# Patient Record
Sex: FEMALE | Race: WHITE
[De-identification: names, ages, dates, MRNs, and addresses within clinical notes are randomized per-mention and may not be internally consistent; named-entity substitution may affect disease eponyms.]

---

## 2017-11-22 NOTE — PCM.PRNOTE
- Free Text/Narrative


Note: 


PROCEDURE PERFORMED:


Esophagogastroduodenoscopy





INDICATIONS FOR PROCEDURE:


Dysphagia, reflux





PRE-PROCEDURE INFORMATION: 


Ms. Concepcion is an 85yoF with longstanding history of GERD and several months 

of occasional upper-to-mid esophageal dysphagia to solids. She currently takes 

lansoprazole 30mg daily. See H&P for details.





CONSENT:


Informed consent was obtained prior to the procedure after discussion of the 

risks (pain, bleeding, infection, perforation), benefits and alternatives and 

expected outcomes were discussed with the patient. Questions answered, verbal 

consent given and consent form signed. 





PROCEDURAL PAUSE:


Completed





DESCRIPTION OF PROCEDURE: Ms. Concepcion was brought back to the endoscopy suite 

and placed in the left reclined position.  Bite block placed.  After adequate 

sedation and anesthetic was administered, endoscope was inserted into the 

patient's mouth and was passed into the stomach without difficulty though a 

torturous esophagus noted.  Examined portion of the duodenum normal. Pylorus 

normal appearing. Mild gastritis without active bleeding. Biopsies taken to 

assess for H. pylori. No poylps or masses. Retroflexion performed. 5cm hiatal 

hernia was present with the gastroesophageal junction at 36 cm from the 

incisors.  The examined esophagus was mildly inflamed in the distal portion, 

but without rings or strictures. Biopsies taken to assess for esophagitis. The 

scope was removed without difficulty. Patient tolerated the procedure well. No 

complications.


 


IMPRESSION:


1. Torturous esophagus


2. Mild distal esophagitis


3. 5cm hiatal hernia


4. Mild gastitis





PLAN: 


Await biopsy results. Continue PPI therapy. Consider speech therapy evaluation 

and barium swallow study if upper-to-mid esophageal dysphagia persists.

## 2020-05-01 ENCOUNTER — HOSPITAL ENCOUNTER (EMERGENCY)
Dept: HOSPITAL 77 - KA.ED | Age: 85
Discharge: HOME | End: 2020-05-01
Payer: MEDICARE

## 2020-05-01 DIAGNOSIS — Z79.82: ICD-10-CM

## 2020-05-01 DIAGNOSIS — N30.00: ICD-10-CM

## 2020-05-01 DIAGNOSIS — Z88.5: ICD-10-CM

## 2020-05-01 DIAGNOSIS — K29.70: Primary | ICD-10-CM

## 2020-05-01 DIAGNOSIS — Z79.899: ICD-10-CM

## 2020-05-01 DIAGNOSIS — I10: ICD-10-CM

## 2020-05-01 DIAGNOSIS — E03.9: ICD-10-CM

## 2020-05-01 DIAGNOSIS — E78.00: ICD-10-CM

## 2020-05-01 DIAGNOSIS — Z88.0: ICD-10-CM

## 2020-05-01 DIAGNOSIS — Z88.8: ICD-10-CM

## 2020-05-01 DIAGNOSIS — K21.9: ICD-10-CM

## 2020-05-01 LAB
ANION GAP SERPL CALC-SCNC: 15.1 MMOL/L (ref 5–15)
CHLORIDE SERPL-SCNC: 104 MMOL/L (ref 98–115)
SODIUM SERPL-SCNC: 145 MMOL/L (ref 136–145)

## 2020-05-01 NOTE — EDM.PDOC
ED HPI GENERAL MEDICAL PROBLEM





- General


Chief Complaint: General


Stated Complaint: ABDOMINAL PAIN


Time Seen by Provider: 05/01/20 19:20


Source of Information: Reports: Patient


History Limitations: Reports: No Limitations





- History of Present Illness


INITIAL COMMENTS - FREE TEXT/NARRATIVE: 





88 YO WF presents to ER with complaints of epigastric abdominal pain with 

associated nausea which began at 2pm today. Pt reports pain as a burning 

sensation with some intermittent cramping in upper abdominal area. Pt with 

history of gastritis and GERD. Pt denies chest pain, shortness of breath, 

dizziness or diaphoresis. Pt reports some associated nausea without vomiting. 

Pt denies fever/chills, no cough or URI symptoms. Pt reports normal bowel 

movements and denies blood in BM. 


Onset: Today


Duration: Chronic


Location: Reports: Abdomen


Quality: Reports: Burning


Severity: Moderate


Improves with: Reports: None


Worsens with: Reports: None


Associated Symptoms: Reports: No Other Symptoms, Nausea/Vomiting


  ** Bilateral Upper Abdomen


Pain Score (Numeric/FACES): 4





- Related Data


 Allergies











Allergy/AdvReac Type Severity Reaction Status Date / Time


 


codeine Allergy  "passed Verified 05/01/20 19:05





   out"  


 


lisinopril Allergy  Cough Verified 05/01/20 19:05


 


Penicillins Allergy  Rash Verified 05/01/20 19:05











Home Meds: 


 Home Meds





Acetaminophen [Tylenol] 650 mg PO Q6H PRN 11/21/17 [History]


Aspirin [Halfprin] 81 mg PO BRK 11/21/17 [History]


Calcium Carbonate/Vitamin D3 [Calcium 500-Vit D3 200 Tablet] 1 each PO DAILY 11/ 21/17 [History]


Denosumab [Prolia] 60 mg SUBCUT ASDIRECTED 11/21/17 [History]


Dextran 70/Hypromellose [Artificial Tears] 1 drop EYEBOTH DAILY PRN 11/21/17 [

History]


Lansoprazole [Prevacid] 30 mg PO DAILY 11/21/17 [History]


Levothyroxine [Synthroid] 50 mcg PO ACBREAKFAST 11/21/17 [History]


Losartan/Hydrochlorothiazide [Losartan-HCTZ 100-12.5 MG] 1 each PO TID 11/21/17 

[History]


Omega-3/DHA/Epa/Fish Oil [Omega 3 500 Softgel] 1 each PO BID 11/21/17 [History]


Potassium Chloride 10 meq PO DAILY 11/21/17 [History]


atorvaSTATin [Lipitor] 40 mg PO BEDTIME 11/21/17 [History]


rOPINIRole [Requip] 1 mg PO 1200,1800,2100 11/21/17 [History]


traZODone HCl [Trazodone HCl] 100 mg PO BEDTIME 11/21/17 [History]


Famotidine [Pepcid] 20 mg PO BID #15 tab 05/01/20 [Rx]


Sulfamethoxazole/Trimethoprim [Septra DS] 1 each PO BID #14 tab 05/01/20 [Rx]











Past Medical History


HEENT History: Reports: Macular Degeneration


Cardiovascular History: Reports: Heart Murmur, High Cholesterol, Hypertension


Gastrointestinal History: Reports: GERD, Hiatal Hernia


Endocrine/Metabolic History: Reports: Hypothyroidism, Osteoporosis





- Infectious Disease History


Infectious Disease History: Reports: Chicken Pox, Measles, Pertussis (Whooping 

Cough)





- Past Surgical History


Head Surgeries/Procedures: Reports: None


Musculoskeletal Surgical History: Reports: Knee Replacement, Shoulder Surgery


Other Musculoskeletal Surgeries/Procedures:: Knee replacements 12/10 & 4/13, 

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





Social & Family History





- Tobacco Use


Smoking Status *Q: Never Smoker





- Caffeine Use


Caffeine Use: Reports: None





- Recreational Drug Use


Recreational Drug Use: No





ED ROS GENERAL





- Review of Systems


Review Of Systems: See Below


Constitutional: Reports: No Symptoms


HEENT: Reports: No Symptoms


Respiratory: Reports: No Symptoms


Cardiovascular: Reports: No Symptoms


Endocrine: Reports: No Symptoms


GI/Abdominal: Reports: Abdominal Pain, Nausea


: Reports: No Symptoms


Musculoskeletal: Reports: No Symptoms


Skin: Reports: No Symptoms


Neurological: Reports: No Symptoms


Psychiatric: Reports: No Symptoms


Hematologic/Lymphatic: Reports: No Symptoms


Immunologic: Reports: No Symptoms





ED EXAM, GENERAL





- Physical Exam


Exam: See Below


Exam Limited By: No Limitations


General Appearance: Alert, WD/WN, No Apparent Distress


Throat/Mouth: Normal Inspection, Normal Lips, Normal Teeth, Normal Gums, Normal 

Oropharynx, Normal Voice, No Airway Compromise


Head: Atraumatic, Normocephalic


Neck: Normal Inspection, Supple, Non-Tender, Full Range of Motion


Respiratory/Chest: No Respiratory Distress, Lungs Clear, Normal Breath Sounds, 

No Accessory Muscle Use, Chest Non-Tender


Cardiovascular: Normal Peripheral Pulses, Regular Rate, Rhythm, No Edema, No 

Gallop, No JVD, No Murmur, No Rub


GI/Abdominal: Normal Bowel Sounds, Soft, Non-Tender, No Organomegaly, No 

Distention, No Abnormal Bruit, No Mass


Back Exam: Normal Inspection, Full Range of Motion, NT


Extremities: Normal Inspection, Normal Range of Motion, Non-Tender, Normal 

Capillary Refill, No Pedal Edema


Neurological: Alert, Oriented, CN II-XII Intact, Normal Cognition, Normal Gait, 

Normal Reflexes, No Motor/Sensory Deficits


Psychiatric: Normal Affect, Normal Mood


Skin Exam: Warm, Dry, Intact, Normal Color, No Rash


Lymphatic: No Adenopathy





EKG INTERPRETATION


EKG Date: 05/01/20


Time: 19:19


Rhythm: NSR


Rate (Beats/Min): 73


Axis: Normal


P-Wave: Present


QRS: Normal


ST-T: Normal


QT: Normal


Comparison: No Change





Course





- Vital Signs


Last Recorded V/S: 


 Last Vital Signs











Temp  36.9 C   05/01/20 19:31


 


Pulse  80   05/01/20 20:46


 


Resp  19   05/01/20 20:46


 


BP  149/74 H  05/01/20 20:46


 


Pulse Ox  97   05/01/20 20:46














- Orders/Labs/Meds


Orders: 


 Active Orders 24 hr











 Category Date Time Status


 


 EKG Documentation Completion [RC] ASDIRECTED Care  05/01/20 19:12 Active


 


 EKG 12 Lead [EK] Urgent Ther  05/01/20 19:11 Ordered











Labs: 


 Laboratory Tests











  05/01/20 05/01/20 05/01/20 Range/Units





  19:15 19:40 19:40 


 


WBC   10.58 H   (5.00-10.00)  10^3/uL


 


RBC   4.17   (3.80-5.50)  10^6/uL


 


Hgb   13.0   (12.0-16.0)  g/dL


 


Hct   38.1   (37.0-47.0)  %


 


MCV   91.4   (82.0-92.0)  fL


 


MCH   31.2 H   (27.0-31.0)  pg


 


MCHC   34.1   (32.0-36.0)  g/dL


 


RDW   13.0   (11.5-14.5)  %


 


Plt Count   185   (150-400)  10^3/uL


 


MPV   8.6   (7.4-10.4)  fL


 


Immature Gran % (Auto)   0.1   (0.0-5.0)  %


 


Neut % (Auto)   87.4 H   (50.0-70.0)  %


 


Lymph % (Auto)   6.1 L   (20.0-40.0)  %


 


Mono % (Auto)   5.5   (2.0-8.0)  %


 


Eos % (Auto)   0.6 L   (1.0-3.0)  %


 


Baso % (Auto)   0.3   (0.0-1.0)  %


 


Immature Gran # (Auto)   0.01   (0.00-0.50)  10^3/uL


 


Neut # (Auto)   9.25 H   (2.50-7.00)  10^3/uL


 


Lymph # (Auto)   0.65 L   (1.00-4.00)  10^3/uL


 


Mono # (Auto)   0.58   (0.10-0.80)  10^3/uL


 


Eos # (Auto)   0.06 L   (0.10-0.30)  10^3/uL


 


Baso # (Auto)   0.03   (0.00-0.10)  10^3/uL


 


Sodium    145  (136-145)  mmol/L


 


Potassium    4.0  (3.3-5.3)  mmol/L


 


Chloride    104  ()  mmol/L


 


Carbon Dioxide    29.9  (21.0-32.0)  mmol/L


 


Anion Gap    15.1 H  (5-15)  mmol/L


 


BUN    23  (6-25)  mg/dL


 


Creatinine    1.01  (0.51-1.17)  mg/dL


 


Est Cr Clr Drug Dosing    TNP  


 


Estimated GFR (MDRD)    52  mL/min


 


Glucose    134 H (75 - 99) mg/dL


 


Calcium    9.9  (8.7-10.3)  mg/dL


 


Total Bilirubin    0.6  (0.2-1.0)  mg/dL


 


AST    22  (15-37)  U/L


 


ALT    27  (12-78)  U/L


 


Alkaline Phosphatase    82  ()  IU/L


 


Creatine Kinase     ()  U/L


 


CK-MB (CK-2)     (0.00-4.30)  ng/mL


 


Troponin I     (0.00-0.070)  ng/mL


 


Total Protein    6.5  (6.4-8.2)  g/dL


 


Albumin    3.71  (3.00-4.80)  g/dL


 


Lipase    138  ()  U/L


 


Specimen Type  Urinvoid    


 


Urine Color  Yellow    (YELLOW)  


 


Urine Appearance  Slightly cloudy H    (CLEAR)  


 


Urine pH  6.5    (5.0-9.0)  


 


Ur Specific Gravity  1.020    (1.005-1.030)  


 


Urine Protein  Negative    (NEGATIVE)  mg/dL


 


Urine Glucose (UA)  Negative    (NEGATIVE)  mg/dL


 


Urine Ketones  Trace H    (NEGATIVE)  mg/dL


 


Urine Occult Blood  Negative    (NEGATIVE)  


 


Urine Nitrite  Negative    (NEGATIVE)  


 


Urine Bilirubin  Negative    (NEGATIVE)  


 


Urine Urobilinogen  0.2    (0.2-1.0)  E.U./dL


 


Ur Leukocyte Esterase  Small H    (NEGATIVE)  


 


Urine RBC  0-5    (0-5)  /HPF


 


Urine WBC  40-50 H    (0-5)  /HPF


 


Ur Epithelial Cells  Few    /LPF


 


Other Crystals  See note    /HPF


 


Amorphous Sediment  Few    (0/HPF)  /HPF


 


Urine Bacteria  Few    (NONE TO FEW)  /HPF














  05/01/20 Range/Units





  19:40 


 


WBC   (5.00-10.00)  10^3/uL


 


RBC   (3.80-5.50)  10^6/uL


 


Hgb   (12.0-16.0)  g/dL


 


Hct   (37.0-47.0)  %


 


MCV   (82.0-92.0)  fL


 


MCH   (27.0-31.0)  pg


 


MCHC   (32.0-36.0)  g/dL


 


RDW   (11.5-14.5)  %


 


Plt Count   (150-400)  10^3/uL


 


MPV   (7.4-10.4)  fL


 


Immature Gran % (Auto)   (0.0-5.0)  %


 


Neut % (Auto)   (50.0-70.0)  %


 


Lymph % (Auto)   (20.0-40.0)  %


 


Mono % (Auto)   (2.0-8.0)  %


 


Eos % (Auto)   (1.0-3.0)  %


 


Baso % (Auto)   (0.0-1.0)  %


 


Immature Gran # (Auto)   (0.00-0.50)  10^3/uL


 


Neut # (Auto)   (2.50-7.00)  10^3/uL


 


Lymph # (Auto)   (1.00-4.00)  10^3/uL


 


Mono # (Auto)   (0.10-0.80)  10^3/uL


 


Eos # (Auto)   (0.10-0.30)  10^3/uL


 


Baso # (Auto)   (0.00-0.10)  10^3/uL


 


Sodium   (136-145)  mmol/L


 


Potassium   (3.3-5.3)  mmol/L


 


Chloride   ()  mmol/L


 


Carbon Dioxide   (21.0-32.0)  mmol/L


 


Anion Gap   (5-15)  mmol/L


 


BUN   (6-25)  mg/dL


 


Creatinine   (0.51-1.17)  mg/dL


 


Est Cr Clr Drug Dosing   


 


Estimated GFR (MDRD)   mL/min


 


Glucose  (75 - 99) mg/dL


 


Calcium   (8.7-10.3)  mg/dL


 


Total Bilirubin   (0.2-1.0)  mg/dL


 


AST   (15-37)  U/L


 


ALT   (12-78)  U/L


 


Alkaline Phosphatase   ()  IU/L


 


Creatine Kinase  81  ()  U/L


 


CK-MB (CK-2)  4.20  (0.00-4.30)  ng/mL


 


Troponin I  0.04  (0.00-0.070)  ng/mL


 


Total Protein   (6.4-8.2)  g/dL


 


Albumin   (3.00-4.80)  g/dL


 


Lipase   ()  U/L


 


Specimen Type   


 


Urine Color   (YELLOW)  


 


Urine Appearance   (CLEAR)  


 


Urine pH   (5.0-9.0)  


 


Ur Specific Gravity   (1.005-1.030)  


 


Urine Protein   (NEGATIVE)  mg/dL


 


Urine Glucose (UA)   (NEGATIVE)  mg/dL


 


Urine Ketones   (NEGATIVE)  mg/dL


 


Urine Occult Blood   (NEGATIVE)  


 


Urine Nitrite   (NEGATIVE)  


 


Urine Bilirubin   (NEGATIVE)  


 


Urine Urobilinogen   (0.2-1.0)  E.U./dL


 


Ur Leukocyte Esterase   (NEGATIVE)  


 


Urine RBC   (0-5)  /HPF


 


Urine WBC   (0-5)  /HPF


 


Ur Epithelial Cells   /LPF


 


Other Crystals   /HPF


 


Amorphous Sediment   (0/HPF)  /HPF


 


Urine Bacteria   (NONE TO FEW)  /HPF











Meds: 


Medications














Discontinued Medications














Generic Name Dose Route Start Last Admin





  Trade Name Freq  PRN Reason Stop Dose Admin


 


Al Hydroxide/Mg Hydroxide 30  0 ml  05/01/20 19:51  05/01/20 19:56





  ml/ Lidocaine HCl 15 ml  PO  05/01/20 19:52  45 ml





  ONETIME ONE   Administration





     





     





     





     


 


Trimethoprim/Sulfamethoxazole  1 tab  05/01/20 20:27  05/01/20 20:52





  Septra Ds  PO  05/01/20 20:28  1 tab





  ONETIME ONE   Administration





     





     





     





     


 


Trimethoprim/Sulfamethoxazole  1 tab  05/01/20 20:34  05/01/20 20:52





  Septra Ds  PO  05/01/20 20:35  1 tab





  ONETIME ONE   Administration





     





     





     





     














- Re-Assessments/Exams


Free Text/Narrative Re-Assessment/Exam: 





05/01/20 21:06


Pt reports pain resolved after GI cocktail. Pt alert and oriented x 4 and in no 

acute distress. 





Departure





- Departure


Time of Disposition: 20:31


Disposition: Home, Self-Care 01


Condition: Good


Clinical Impression: 


Gastritis


Qualifiers:


 Gastritis type: unspecified gastritis Chronicity: unspecified Gastritis 

bleeding: without bleeding Qualified Code(s): K29.70 - Gastritis, unspecified, 

without bleeding





Urinary tract infection


Qualifiers:


 Urinary tract infection type: acute cystitis Hematuria presence: without 

hematuria Qualified Code(s): N30.00 - Acute cystitis without hematuria








- Discharge Information


Prescriptions: 


Famotidine [Pepcid] 20 mg PO BID #15 tab


Sulfamethoxazole/Trimethoprim [Septra DS] 1 each PO BID #14 tab


Instructions:  Gastritis, Adult, Urinary Tract Infection, Adult, Easy-to-Read


Referrals: 


Giselle Argueta MD [Primary Care Provider] - 


Forms:  ED Department Discharge


Additional Instructions: 


1. Discharge home


2. Septra DS twice/day x 7 days


3. pepcid 20mg twice/day x 7 days


4. follow up with PCP next 48-72 hours for recheck


5. return to ER for worsening symptoms








Sepsis Event Note





- Evaluation


Sepsis Screening Result: No Definite Risk





- Focused Exam


Vital Signs: 


 Vital Signs











  Temp Pulse Resp BP Pulse Ox


 


 05/01/20 20:46   80  19  149/74 H  97


 


 05/01/20 19:40   73  20  170/70 H  96


 


 05/01/20 19:31  36.9 C  73  20  180/64 H  96











Date Exam was Performed: 05/01/20


Time Exam was Performed: 21:06





- My Orders


Last 24 Hours: 


My Active Orders





05/01/20 19:11


EKG 12 Lead [EK] Urgent 





05/01/20 19:12


EKG Documentation Completion [RC] ASDIRECTED 














- Assessment/Plan


Last 24 Hours: 


My Active Orders





05/01/20 19:11


EKG 12 Lead [EK] Urgent 





05/01/20 19:12


EKG Documentation Completion [RC] ASDIRECTED 











Assessment:: 





1. Gastritis 


2. UTI


Plan: 





1. Discharge home


2. Septra DS twice/day x 7 days


3. pepcid 20mg twice/day x 7 days


4. follow up with PCP next 48-72 hours for recheck


5. return to ER for worsening symptoms

## 2020-05-02 ENCOUNTER — HOSPITAL ENCOUNTER (EMERGENCY)
Dept: HOSPITAL 77 - KA.ED | Age: 85
Discharge: HOME | End: 2020-05-02
Payer: MEDICARE

## 2020-05-02 DIAGNOSIS — Z88.0: ICD-10-CM

## 2020-05-02 DIAGNOSIS — Z79.899: ICD-10-CM

## 2020-05-02 DIAGNOSIS — E03.9: ICD-10-CM

## 2020-05-02 DIAGNOSIS — Z88.8: ICD-10-CM

## 2020-05-02 DIAGNOSIS — Z88.5: ICD-10-CM

## 2020-05-02 DIAGNOSIS — S51.811A: Primary | ICD-10-CM

## 2020-05-02 DIAGNOSIS — E78.00: ICD-10-CM

## 2020-05-02 DIAGNOSIS — K21.9: ICD-10-CM

## 2020-05-02 DIAGNOSIS — I10: ICD-10-CM
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
## 2020-05-02 DIAGNOSIS — Z98.890: ICD-10-CM

## 2020-05-02 NOTE — EDM.PDOC
ED HPI GENERAL MEDICAL PROBLEM





- General


Chief Complaint: Upper Extremity Injury/Pain


Stated Complaint: SKIN TEAR ARM


Time Seen by Provider: 05/02/20 16:24


Source of Information: Reports: Patient


History Limitations: Reports: No Limitations





- History of Present Illness


INITIAL COMMENTS - FREE TEXT/NARRATIVE: 





88 YO WF presents to ER with minor injury to right forearm after stumbling and 

bumping her arm on a piece of furniture. Pt reports mild bleeding at site. Pt 

was concerned she may need sutures prompting ER evaluation. Pt denies any pain, 

bleeding is well controlled. Pt denies dizziness/lightheadedness, no chest pain/

shortness of breath. Pt ambulating without difficulty and denies any other 

injury. 


Onset: Today


Location: Reports: Upper Extremity, Right


Severity: Mild


Improves with: Reports: None


Worsens with: Reports: None


Associated Symptoms: Reports: No Other Symptoms





- Related Data


 Allergies











Allergy/AdvReac Type Severity Reaction Status Date / Time


 


codeine Allergy  "passed Verified 05/01/20 19:05





   out"  


 


lisinopril Allergy  Cough Verified 05/01/20 19:05


 


Penicillins Allergy  Rash Verified 05/01/20 19:05











Home Meds: 


 Home Meds





Acetaminophen [Tylenol] 650 mg PO Q6H PRN 11/21/17 [History]


Aspirin [Halfprin] 81 mg PO BRK 11/21/17 [History]


Calcium Carbonate/Vitamin D3 [Calcium 500-Vit D3 200 Tablet] 1 each PO DAILY 11/ 21/17 [History]


Denosumab [Prolia] 60 mg SUBCUT ASDIRECTED 11/21/17 [History]


Dextran 70/Hypromellose [Artificial Tears] 1 drop EYEBOTH DAILY PRN 11/21/17 [

History]


Lansoprazole [Prevacid] 30 mg PO DAILY 11/21/17 [History]


Levothyroxine [Synthroid] 50 mcg PO ACBREAKFAST 11/21/17 [History]


Losartan/Hydrochlorothiazide [Losartan-HCTZ 100-12.5 MG] 1 each PO TID 11/21/17 

[History]


Omega-3/DHA/Epa/Fish Oil [Omega 3 500 Softgel] 1 each PO BID 11/21/17 [History]


Potassium Chloride 10 meq PO DAILY 11/21/17 [History]


atorvaSTATin [Lipitor] 40 mg PO BEDTIME 11/21/17 [History]


rOPINIRole [Requip] 1 mg PO 1200,1800,2100 11/21/17 [History]


traZODone HCl [Trazodone HCl] 100 mg PO BEDTIME 11/21/17 [History]


Famotidine [Pepcid] 20 mg PO BID #15 tab 05/01/20 [Rx]


Sulfamethoxazole/Trimethoprim [Septra DS] 1 each PO BID #14 tab 05/01/20 [Rx]











Past Medical History


HEENT History: Reports: Macular Degeneration


Cardiovascular History: Reports: Heart Murmur, High Cholesterol, Hypertension


Gastrointestinal History: Reports: GERD, Hiatal Hernia


Endocrine/Metabolic History: Reports: Hypothyroidism, Osteoporosis





- Infectious Disease History


Infectious Disease History: Reports: Chicken Pox, Measles, Pertussis (Whooping 

Cough)





- Past Surgical History


Head Surgeries/Procedures: Reports: None


Musculoskeletal Surgical History: Reports: Knee Replacement, Shoulder Surgery


Other Musculoskeletal Surgeries/Procedures:: Knee replacements 12/10 & 4/13, 

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





Social & Family History





- Family History


Family Medical History: Noncontributory





- Caffeine Use


Caffeine Use: Reports: None





Review of Systems





- Review of Systems


Review Of Systems: See Below


Constitutional: Reports: No Symptoms


Eyes: Reports: No Symptoms


Ears: Reports: No Symptoms


Nose: Reports: No Symptoms


Mouth/Throat: Reports: No Symptoms


Respiratory: Reports: No Symptoms


Cardiovascular: Reports: No Symptoms


GI/Abdominal: Reports: No Symptoms


Genitourinary: Reports: No Symptoms


Musculoskeletal: Reports: No Symptoms


Skin: Reports: Wound (skin tear without laceration to right forearm )


Neurological: Reports: No Symptoms


Psychiatric: Reports: No Symptoms





ED EXAM, GENERAL





- Physical Exam


Exam: See Below


Exam Limited By: No Limitations


General Appearance: Alert, WD/WN, No Apparent Distress


Head: Atraumatic, Normocephalic


Neck: Normal Inspection, Supple, Non-Tender, Full Range of Motion


Respiratory/Chest: No Respiratory Distress, Lungs Clear, Normal Breath Sounds, 

No Accessory Muscle Use, Chest Non-Tender


Cardiovascular: Normal Peripheral Pulses, Regular Rate, Rhythm, No Edema, No 

Gallop, No JVD, No Murmur, No Rub


GI/Abdominal: Normal Bowel Sounds, Soft, Non-Tender, No Organomegaly, No 

Distention, No Abnormal Bruit, No Mass


Back Exam: Normal Inspection, Full Range of Motion, NT


Extremities: Normal Range of Motion, Non-Tender, No Pedal Edema, Normal 

Capillary Refill


Neurological: Alert, Oriented, CN II-XII Intact, Normal Cognition, Normal Gait, 

Normal Reflexes, No Motor/Sensory Deficits


Psychiatric: Normal Affect, Normal Mood


Skin Exam: Warm, Dry, Normal Color, No Rash, Wound/Incision (skin tear without 

laceration to right forearm )





Course





- Orders/Labs/Meds


Orders: 





 Active Orders 24 hr











 Category Date Time Status


 


 Bacitracin/Neomycin/Polymyxin [Triple Antibiotic Oint] Med  05/02/20 16:34 Once





 1 each TOP ONETIME ONE   








 Medication Orders





Neomycin/Polymyxin/Bacitracin (Triple Antibiotic Oint)  1 each TOP ONETIME ONE


   Stop: 05/02/20 16:35








Meds: 





Medications











Generic Name Dose Route Start Last Admin





  Trade Name Freq  PRN Reason Stop Dose Admin


 


Neomycin/Polymyxin/Bacitracin  1 each  05/02/20 16:34  





  Triple Antibiotic Oint  TOP  05/02/20 16:35  





  ONETIME ONE   





     





     





     





     














Departure





- Departure


Time of Disposition: 16:47


Disposition: Home, Self-Care 01


Condition: Good


Clinical Impression: 


 Skin tear








- Discharge Information


Instructions:  Skin Tear, Easy-to-Read


Referrals: 


Giselle Argueta MD [Primary Care Provider] - 


Forms:  ED Department Discharge


Additional Instructions: 


1. discharge home


2. wound care instructions given


3. tetanus UTD


4. keep wound clean and dry


5. follow up with PCP as needed


6. return to ER for worsening symptoms or signs of infection





- My Orders


Last 24 Hours: 





My Active Orders





05/02/20 16:34


Bacitracin/Neomycin/Polymyxin [Triple Antibiotic Oint]   1 each TOP ONETIME ONE 














- Assessment/Plan


Last 24 Hours: 





My Active Orders





05/02/20 16:34


Bacitracin/Neomycin/Polymyxin [Triple Antibiotic Oint]   1 each TOP ONETIME ONE 











Assessment:: 





1. skin tear without laceration to right forearm 


Plan: 





1. discharge home


2. wound care instructions given


3. tetanus UTD


4. keep wound clean and dry


5. follow up with PCP as needed


6. return to ER for worsening symptoms or signs of infection

## 2021-11-06 ENCOUNTER — HOSPITAL ENCOUNTER (INPATIENT)
Dept: HOSPITAL 77 - KA.ED | Age: 86
LOS: 2 days | Discharge: HOME | DRG: 390 | End: 2021-11-08
Attending: NURSE PRACTITIONER | Admitting: PHYSICIAN ASSISTANT
Payer: MEDICARE

## 2021-11-06 DIAGNOSIS — I35.0: ICD-10-CM

## 2021-11-06 DIAGNOSIS — E78.00: ICD-10-CM

## 2021-11-06 DIAGNOSIS — H35.30: ICD-10-CM

## 2021-11-06 DIAGNOSIS — I36.1: ICD-10-CM

## 2021-11-06 DIAGNOSIS — R13.10: ICD-10-CM

## 2021-11-06 DIAGNOSIS — M41.9: ICD-10-CM

## 2021-11-06 DIAGNOSIS — K56.609: Primary | ICD-10-CM

## 2021-11-06 DIAGNOSIS — E87.6: ICD-10-CM

## 2021-11-06 DIAGNOSIS — Z88.5: ICD-10-CM

## 2021-11-06 DIAGNOSIS — M43.10: ICD-10-CM

## 2021-11-06 DIAGNOSIS — M54.16: ICD-10-CM

## 2021-11-06 DIAGNOSIS — E03.9: ICD-10-CM

## 2021-11-06 DIAGNOSIS — Z96.659: ICD-10-CM

## 2021-11-06 DIAGNOSIS — K44.9: ICD-10-CM

## 2021-11-06 DIAGNOSIS — G47.00: ICD-10-CM

## 2021-11-06 DIAGNOSIS — Z88.0: ICD-10-CM

## 2021-11-06 DIAGNOSIS — Z20.822: ICD-10-CM

## 2021-11-06 DIAGNOSIS — Z79.899: ICD-10-CM

## 2021-11-06 DIAGNOSIS — I10: ICD-10-CM

## 2021-11-06 DIAGNOSIS — M19.90: ICD-10-CM

## 2021-11-06 DIAGNOSIS — Z88.8: ICD-10-CM

## 2021-11-06 DIAGNOSIS — K57.90: ICD-10-CM

## 2021-11-06 DIAGNOSIS — Z96.619: ICD-10-CM

## 2021-11-06 DIAGNOSIS — R10.13: ICD-10-CM

## 2021-11-06 DIAGNOSIS — M81.0: ICD-10-CM

## 2021-11-06 DIAGNOSIS — Z66: ICD-10-CM

## 2021-11-06 DIAGNOSIS — Z79.82: ICD-10-CM

## 2021-11-06 DIAGNOSIS — K21.9: ICD-10-CM

## 2021-11-06 DIAGNOSIS — G25.81: ICD-10-CM

## 2021-11-06 DIAGNOSIS — Z79.890: ICD-10-CM

## 2021-11-06 DIAGNOSIS — E78.5: ICD-10-CM

## 2021-11-06 DIAGNOSIS — M85.80: ICD-10-CM

## 2021-11-06 LAB
ANION GAP SERPL CALC-SCNC: 17.7 MMOL/L (ref 5–15)
CHLORIDE SERPL-SCNC: 101 MMOL/L (ref 98–107)
SODIUM SERPL-SCNC: 141 MMOL/L (ref 136–145)

## 2021-11-06 PROCEDURE — U0002 COVID-19 LAB TEST NON-CDC: HCPCS

## 2021-11-06 PROCEDURE — C9113 INJ PANTOPRAZOLE SODIUM, VIA: HCPCS

## 2021-11-06 RX ADMIN — SODIUM CHLORIDE SCH MLS/HR: 9 INJECTION, SOLUTION INTRAVENOUS at 20:52

## 2021-11-06 RX ADMIN — SODIUM CHLORIDE SCH MLS/HR: 9 INJECTION, SOLUTION INTRAVENOUS at 10:25

## 2021-11-06 RX ADMIN — SODIUM CHLORIDE SCH: 9 INJECTION, SOLUTION INTRAVENOUS at 17:36

## 2021-11-06 NOTE — PCM.HP.2
H&P History of Present Illness





- General


Date of Service: 11/06/21


Admit Problem/Dx: 


                           Admission Diagnosis/Problem





Admission Diagnosis/Problem      Small bowel obstruction








  ** Bilateral Middle Abdomen


Pain Score (Numeric/FACES): 6





- Related Data


Allergies/Adverse Reactions: 


                                    Allergies











Allergy/AdvReac Type Severity Reaction Status Date / Time


 


codeine Allergy  "passed Verified 11/06/21 09:23





   out"  


 


lisinopril Allergy  Cough Verified 11/06/21 09:23


 


Penicillins Allergy  Rash Verified 11/06/21 09:23











Home Medications: 


                                    Home Meds





Aspirin [Halfprin] 81 mg PO BRK 11/21/17 [History]


Denosumab [Prolia] 60 mg SUBCUT ASDIRECTED 11/21/17 [History]


Levothyroxine [Synthroid] 50 mcg PO ACBREAKFAST 11/21/17 [History]


Omega-3/DHA/Epa/Fish Oil [Omega 3 500 Softgel] 1 each PO BID 11/21/17 [History]


Potassium Chloride 10 meq PO DAILY 11/21/17 [History]


atorvaSTATin [Lipitor] 40 mg PO BEDTIME 11/21/17 [History]


rOPINIRole [Requip] 2 mg PO 1200,1800,2100 11/21/17 [History]


traZODone HCl [Trazodone HCl] 150 mg PO BEDTIME 11/21/17 [History]


Calcium Carbonate/Vitamin D3 [Calcium 500 + Vit D 200 Tablet] 1 each PO DAILY 

11/06/21 [History]


Carboxymethylcellulose Sodium [Artificial Tears] 1 ml EYEBOTH Q1H PRN 11/06/21 

[History]


Famotidine [Pepcid] 20 mg PO DAILY 11/06/21 [History]


Hydrocodone/Acetaminophen [HYDROcodone-Acetaminophen  MG] 1 tab PO BID 

11/06/21 [History]


Hydrocodone/Acetaminophen [HYDROcodone-Acetaminophen 5-325 MG] 1 tab PO BEDTIME 

PRN 11/06/21 [History]


Lutein/Min/Vit C/Vit E Acetate [Ocuvite Lutein] 1 cap PO BIDMEALS 11/06/21 

[History]


Pantoprazole [ProTONIX***] 40 mg PO QPM 11/06/21 [History]











Past Medical History


HEENT History: Reports: Macular Degeneration


Cardiovascular History: Reports: Heart Murmur, High Cholesterol, Hypertension


Gastrointestinal History: Reports: GERD, Hiatal Hernia


Endocrine/Metabolic History: Reports: Hypothyroidism, Osteoporosis





- Infectious Disease History


Infectious Disease History: Reports: Chicken Pox, Measles, Pertussis (Whooping 

Cough)





- Past Surgical History


Head Surgeries/Procedures: Reports: None


Musculoskeletal Surgical History: Reports: Knee Replacement, Shoulder Surgery


Other Musculoskeletal Surgeries/Procedures:: Knee replacements 12/10 & 4/13, 

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





Social & Family History





- Family History


Family Medical History: No Pertinent Family History





- Tobacco Use


Tobacco Use Status *Q: Never Tobacco User





- Caffeine Use


Caffeine Use: Reports: Coffee





- Recreational Drug Use


Recreational Drug Use: No





H&P Review of Systems





- Review of Systems:


Review Of Systems: See Below


General: Reports: No Symptoms.  Denies: Fever, Chills, Weakness, Fatigue


HEENT: Reports: Other (macular degeneration)


Pulmonary: Reports: No Symptoms


Cardiovascular: Reports: No Symptoms


Gastrointestinal: Reports: Abdominal Pain, Distension (very mild), Nausea.  

Denies: Constipation (bowel movement this morning at 0300), Diarrhea, Flatus, 

Vomiting


Genitourinary: Reports: No Symptoms


Musculoskeletal: Reports: Back Pain (chronic back pain, tolerable)


Skin: Reports: No Symptoms


Psychiatric: Reports: No Symptoms


Neurological: Reports: No Symptoms


Hematologic/Lymphatic: Reports: No Symptoms


Immunologic: Reports: No Symptoms





Exam





- Exam


Exam: See Below





- Vital Signs


Vital Signs: 


                                Last Vital Signs











Temp  98.3 F   11/06/21 11:47


 


Pulse  83   11/06/21 11:47


 


Resp  18   11/06/21 11:47


 


BP  176/85 H  11/06/21 11:47


 


Pulse Ox  95   11/06/21 11:47











Weight: 119 lb





- Exam


Quality Assessment: No: Supplemental Oxygen


General: Alert, Oriented, Cooperative.  No: Mild Distress


HEENT: Conjunctiva Clear, Mucosa Moist & Pink


Neck: Supple, Trachea Midline


Lungs: Clear to Auscultation, Normal Respiratory Effort


Cardiovascular: Regular Rate, Regular Rhythm, Systolic Murmur


GI/Abdominal Exam: Soft, Distended (very minimal), Tender (moderate tenderness 

to palpation), Abnormal Bowel Sounds (hyperactive).  No: Guarding, Rigid, 

Rebound


 (Female) Exam: Deferred


Rectal (Female) Exam: Deferred


Back Exam: Normal Inspection, Full Range of Motion


Extremities: Normal Inspection, Normal Range of Motion, Non-Tender, No Pedal 

Edema, Normal Capillary Refill


Peripheral Pulses: 2+: Dorsalis Pedis (L), Dorsalis Pedis (R)


Skin: Warm, Dry, Intact


Neurological: Normal Speech


Neuro Extensive - Mental Status: Alert, Oriented x3, Normal Mood/Affect, Normal 

Cognition, Memory Intact


Psychiatric: Alert, Normal Affect, Normal Mood





- Patient Data


Lab Results Last 24 hrs: 


                         Laboratory Results - last 24 hr











  11/06/21 11/06/21 11/06/21 Range/Units





  08:57 09:10 11:39 


 


WBC   9.96   (5.00-10.00)  10^3/uL


 


RBC   4.33   (3.80-5.50)  10^6/uL


 


Hgb   12.7   (12.0-16.0)  g/dL


 


Hct   39.3   (37.0-47.0)  %


 


MCV   90.8   (82.0-92.0)  fL


 


MCH   29.3   (27.0-31.0)  pg


 


MCHC   32.3   (32.0-36.0)  g/dL


 


RDW   13.0   (11.5-14.5)  %


 


Plt Count   181   (150-400)  10^3/uL


 


MPV   8.7   (7.4-10.4)  fL


 


Add Manual Diff   Yes   


 


Neutrophils % (Manual)   96 H   (50-70)  %


 


Lymphocytes % (Manual)   2 L   (20-40)  %


 


Monocytes % (Manual)   2   (2-8)  %


 


Toxic Granulation   1+ slight   


 


Sodium  141    (136-145)  mmol/L


 


Potassium  4.1    (3.5-5.1)  mmol/L


 


Chloride  101    ()  mmol/L


 


Carbon Dioxide  26.4    (21.0-32.0)  mmol/L


 


Anion Gap  17.7 H    (5-15)  mmol/L


 


BUN  17    (7-18)  mg/dL


 


Creatinine  0.78    (0.51-1.17)  mg/dL


 


Est Cr Clr Drug Dosing  35.12    mL/min


 


Estimated GFR (MDRD)  > 60    mL/min


 


Glucose  170 H    ()  mg/dL


 


Calcium  10.1    (8.7-10.3)  mg/dL


 


Total Bilirubin  0.6    (0.2-1.0)  mg/dL


 


AST  47 H    (15-37)  U/L


 


ALT  49    (14-63)  U/L


 


Alkaline Phosphatase  222 H    ()  U/L


 


C-Reactive Protein     (0.0-0.9)  mg/dL


 


Total Protein  6.9    (6.4-8.2)  g/dL


 


Albumin  3.48    (3.40-5.00)  g/dL


 


Lipase     ()  U/L


 


SARS CoV-2 RNA Rapid COLLIN    Negative  (NEGATIVE)  














  11/06/21 11/06/21 Range/Units





  12:17 12:31 


 


WBC    (5.00-10.00)  10^3/uL


 


RBC    (3.80-5.50)  10^6/uL


 


Hgb    (12.0-16.0)  g/dL


 


Hct    (37.0-47.0)  %


 


MCV    (82.0-92.0)  fL


 


MCH    (27.0-31.0)  pg


 


MCHC    (32.0-36.0)  g/dL


 


RDW    (11.5-14.5)  %


 


Plt Count    (150-400)  10^3/uL


 


MPV    (7.4-10.4)  fL


 


Add Manual Diff    


 


Neutrophils % (Manual)    (50-70)  %


 


Lymphocytes % (Manual)    (20-40)  %


 


Monocytes % (Manual)    (2-8)  %


 


Toxic Granulation    


 


Sodium    (136-145)  mmol/L


 


Potassium    (3.5-5.1)  mmol/L


 


Chloride    ()  mmol/L


 


Carbon Dioxide    (21.0-32.0)  mmol/L


 


Anion Gap    (5-15)  mmol/L


 


BUN    (7-18)  mg/dL


 


Creatinine    (0.51-1.17)  mg/dL


 


Est Cr Clr Drug Dosing    mL/min


 


Estimated GFR (MDRD)    mL/min


 


Glucose    ()  mg/dL


 


Calcium    (8.7-10.3)  mg/dL


 


Total Bilirubin    (0.2-1.0)  mg/dL


 


AST    (15-37)  U/L


 


ALT    (14-63)  U/L


 


Alkaline Phosphatase    ()  U/L


 


C-Reactive Protein  2.0 H   (0.0-0.9)  mg/dL


 


Total Protein    (6.4-8.2)  g/dL


 


Albumin    (3.40-5.00)  g/dL


 


Lipase   79  ()  U/L


 


SARS CoV-2 RNA Rapid COLLIN    (NEGATIVE)  











Result Diagrams: 


                                 11/06/21 09:10





                                 11/06/21 08:57





Sepsis Event Note





- Evaluation


Sepsis Screening Result: No Definite Risk





- Focused Exam


Vital Signs: 


                                   Vital Signs











  Temp Pulse Resp BP Pulse Ox


 


 11/06/21 11:47  98.3 F  83  18  176/85 H  95


 


 11/06/21 11:30   84  18  162/90 H  95


 


 11/06/21 10:45   85  16  163/78 H  95


 


 11/06/21 10:00   68  16  170/75 H  96


 


 11/06/21 09:15   73  18  165/84 H  96


 


 11/06/21 09:10  97.1 F  67  14  180/87 H  96











Problem List Initiated/Reviewed/Updated: Yes


Orders Last 24hrs: 


                               Active Orders 24 hr











 Category Date Time Status


 


 Patient Status [ADT] Routine ADT  11/06/21 11:47 Active


 


 Intake and Output [RC] QSHIFT Care  11/06/21 12:17 Active


 


 Oxygen Therapy [RC] PRN Care  11/06/21 12:17 Active


 


 Up With Assistance [RC] ASDIRECTED Care  11/06/21 12:17 Active


 


 VTE/DVT Education [RC] PER UNIT ROUTINE Care  11/06/21 12:17 Active


 


 Vital Signs [RC] Q4H Care  11/06/21 11:47 Active


 


 Nothing per Oral Now Diet [DIET] Diet  11/06/21 Dinner Active


 


 CBC W/O DIFF,HEMOGRAM [HEME] AM Lab  11/07/21 05:11 Ordered


 


 COMPREHENSIVE METABOLIC PN,CMP [CHEM] AM Lab  11/07/21 05:11 Ordered


 


 Carboxymethylcellulose Sodium [Refresh Tears 0.5%] Med  11/06/21 12:33 Active





 0 ml EYEBOTH Q1H PRN   


 


 Levothyroxine [Synthroid] Med  11/07/21 07:30 Active





 50 mcg PO ACBREAKFAST   


 


 Morphine Med  11/06/21 12:32 Active





 1 mg IVPUSH Q4H PRN   


 


 Pantoprazole [ProTONIX IV***] Med  11/06/21 21:00 Active





 40 mg IVPUSH Q12H   


 


 Promethazine [Phenergan] 12.5 mg Med  11/06/21 09:15 Active





 Sodium Chloride 0.9% [Normal Saline] 100 ml   





 IV Q6H   


 


 Sodium Chloride 0.9% [Normal Saline] 1,000 ml Med  11/06/21 12:45 Active





 IV ASDIRECTED   


 


 Sodium Chloride 0.9% [Normal Saline] 50 ml Med  11/06/21 10:15 Active





 IV ASDIRECTED   


 


 rOPINIRole [Requip] Med  11/06/21 18:00 Active





 2 mg PO 1200,1800,2100   


 


 EKG 12 Lead [EK] Urgent Ther  11/06/21 10:32 Ordered








                                Medication Orders





Artificial Tears (Carboxymethylcellulose Sodium 0.5% Ophth Soln 15 Ml Bottle)  0

 ml EYEBOTH Q1H PRN


   PRN Reason: dryeyes


Promethazine HCl 12.5 mg/ (Sodium Chloride)  100.5 mls @ 400 mls/hr IV Q6H [facility]


   Last Admin: 11/06/21 10:25  Dose: 400 mls/hr


   Documented by: ABEL


Sodium Chloride (Normal Saline)  50 mls @ 200 mls/hr IV ASDIRECTED [facility]


   Last Admin: 11/06/21 10:32  Dose: 200 mls/hr


   Documented by: STEFFI


Sodium Chloride (Normal Saline)  1,000 mls @ 100 mls/hr IV ASDIRECTED [facility]


Levothyroxine Sodium (Levothyroxine 50 Mcg Tab)  50 mcg PO ACBREAKFAST [facility]


Morphine Sulfate (Morphine 2 Mg/Ml Syringe)  1 mg IVPUSH Q4H PRN


   PRN Reason: Abdominal Pain


Pantoprazole Sodium (Pantoprazole 40 Mg Vial)  40 mg IVPUSH Q12H JALEN


Ropinirole HCl (Ropinirole 1 Mg Tab)  2 mg PO 1200,1800,2100 [facility]








Assessment/Plan Comment:: 


HPI summary: Linsey is an 89yF patient who presented to the ER this morning by 

private vehicle for c/o midepigastric pain with onset around 0400 this am.  

Patient has known history of GERD, she takes pantoprazole BID and took a GI 

cocktail this morning which she takes at home PRN.  Patient c/o nausea, dry 

heaving, no vomiting.  No diarrhea, fever or chills.  Denies flatus, however 

reports a BM this am.  





ED course: BP elevated initially, improved with morphine.  Abdominal CT 

indicates numerous fluid filled distended loops of small bowel with transition 

point in the R hemipelvis.  Loops of bowel measure up to 3.6cm.  Liver, spleen, 

gallbladder and pancreas unremarkable.  NS 500ml given, 1mg morphine sulfate for

 abdominal pain, phenergan for nausea.  Patient admitted to inpatient status for

 SBO on NPO status for bowel rest.  No indication for NG placement as symptoms 

rather mild, no vomiting or significant abdominal distension.   





Hospital course: 


11/6/21:  Nausea improved with anti-emetic in ER, generalized abdominal pain 

improved with morphine in ER.  Reports dry heaves, no vomiting.  No flatus, BM 

this morning at 0300 which was formed per patient.  Denies chills, fever.  Bowel

 sounds hyperactive, abdomen soft, mildly tender to palpation, no significant 

distension.  WBC 9.96, Hgb 12.7, Plt 181.  Na 141, K 4.1, BUN 17, Crt 0.78, AST 

47, Alk phos 222.  Lipase 79, CRP 2.0.  Will hold off on NG tube unless symptoms

 worsen including vomiting, increased abdominal pain or distension. 





Hospitalization problems and plan:


# Small bowel obstruction  - hx of diverticulosis - no evidence of 

diverticulitis on CT, CRP without significant elevation, No leukocytosis.  

Initial conservative management to be initiated with consideration of NG tube 

and possible surgical consultation for any concern of deteriorating clinical 

condition, particularly given hx of diverticulosis, though no evidence of 

diverticulitis identified on CT. 


   - NPO, few ice chips permitted


   - NS @ 100ml/hr 


   - Bowel rest


   - Holding most of PO medications due to NPO status given SBO


   - Morphine 1mg IV Q4H PRN abdominal pain


   - Pantoprazole 40mg IV BID 


   - Phenergan 12.5mg IV Q6H PRN nausea 


   - Dulcolax suppository per Dr Marcelino 


   - Repeat labs in am:  CBC, CMP, CRP 





Chronic, stable conditions:


# Aortic stenosis - moderate


# Tricuspid regurgitation - mild to moderate


# HLD - takes atorvastatin 40mg PO daily, Lovaza 1gm BID (hold)


# Hypothyroidism - continue levothyroxine 50mcg PO daily


# Hypokalemia - takes micro K 10mEq daily (on hold) 


# Insomnia - takes trazodone 150mg PO HS (on hold)


# RLS - continue requip 2mg PO TID


# Lumbar radiculitis


# Diverticulosis


# Esophageal reflux - takes pepcid 20mg PO daily (HOLD), pantoprazole 40mg PO 

BID (IV formulation in hospital)


# Dysphagia


# Osteoarthritis


# Osteopenia - takes Oscal (HOLD)


# Degenerative disc disease


# Scoliosis of the lumbar spine


# Spondylolithesis - takes hydrocodone 10/325 BID daily hydrocodone, 5/325 PRN 

at HS for severe pain 


# Macular degeneration - continue artificial tears, hold ocuvite


# Pain management contract 





Hospitalization details:


# FEN: NS @ 100ml/hr, electrolytes stable, NPO with ice chips


# PPX: Continue pantoprazole 40mg IV BID


# Code status: DNR/DNI - POLST UPDATED 


# Emergency contact: Blake Ho [phone]


# Disposition: Patient admitted to inpatient status for management of small 

bowel obstruction; I anticipate > 2 midnights for treatment of [facility] alisa tyson.

## 2021-11-06 NOTE — EDM.PDOC
ED HPI GENERAL MEDICAL PROBLEM





- General


Chief Complaint: Abdominal Pain


Stated Complaint: BAD STOMACHE, midepigastric pain


Time Seen by Provider: 11/06/21 09:00


Source of Information: Reports: Patient


History Limitations: Reports: No Limitations





- History of Present Illness


INITIAL COMMENTS - FREE TEXT/NARRATIVE: 





Very pleasant 89-year-old female presents to the emergency room with complaints 

of midepigastric pain that started early this morning. She has a history of 

gastritis. States the pain is a dull achy pain and continuous. She takes 

Protonix for her maintenance. She did try a GI cocktail early this morning 

without relief. She has been experiencing some nausea not been having any 

vomiting. She denies any abdominal bloating or distention. No diarrhea. No 

complaints of blood in her stool no urinary symptoms. No hematuria. She is not 

been running any fever or chills she is does not complain of chest pain 

shortness of breath or respiratory complaints. She denies any cough. She denies 

any congestion. She is brought in by her  for further evaluation.


Onset: Today


Onset Date: 11/06/21


Onset Time: 03:00


Duration: Hour(s):, Constant


Location: Reports: Abdomen (Midepigastric)


Quality: Reports: Ache


Severity: Moderate


Improves with: Reports: None


Worsens with: Reports: None


Associated Symptoms: Reports: Nausea/Vomiting (Nausea no vomiting).  Denies: 

Confusion, Chest Pain, Cough, Diaphoresis, Fever/Chills, Headaches, Shortness of

Breath


Treatments PTA: Reports: Other Medication(s) (GI cocktail at home)


  ** Bilateral Middle Abdomen


Pain Score (Numeric/FACES): 6





- Related Data


                                    Allergies











Allergy/AdvReac Type Severity Reaction Status Date / Time


 


codeine Allergy  "passed Verified 11/06/21 09:23





   out"  


 


lisinopril Allergy  Cough Verified 11/06/21 09:23


 


Penicillins Allergy  Rash Verified 11/06/21 09:23











Home Meds: 


                                    Home Meds





Acetaminophen [Tylenol] 650 mg PO Q6H PRN 11/21/17 [History]


Aspirin [Halfprin] 81 mg PO BRK 11/21/17 [History]


Calcium Carbonate/Vitamin D3 [Calcium 500-Vit D3 200 Tablet] 1 each PO DAILY 

11/21/17 [History]


Denosumab [Prolia] 60 mg SUBCUT ASDIRECTED 11/21/17 [History]


Dextran 70/Hypromellose [Artificial Tears] 1 drop EYEBOTH DAILY PRN 11/21/17 [

History]


Lansoprazole [Prevacid] 30 mg PO DAILY 11/21/17 [History]


Levothyroxine [Synthroid] 50 mcg PO ACBREAKFAST 11/21/17 [History]


Losartan/Hydrochlorothiazide [Losartan-HCTZ 100-12.5 MG] 1 each PO TID 11/21/17 

[History]


Omega-3/DHA/Epa/Fish Oil [Omega 3 500 Softgel] 1 each PO BID 11/21/17 [History]


Potassium Chloride 10 meq PO DAILY 11/21/17 [History]


atorvaSTATin [Lipitor] 40 mg PO BEDTIME 11/21/17 [History]


rOPINIRole [Requip] 1 mg PO 1200,1800,2100 11/21/17 [History]


traZODone HCl [Trazodone HCl] 100 mg PO BEDTIME 11/21/17 [History]


Famotidine [Pepcid] 20 mg PO BID #15 tab 05/01/20 [Rx]


Sulfamethoxazole/Trimethoprim [Septra DS] 1 each PO BID #14 tab 05/01/20 [Rx]











Past Medical History


HEENT History: Reports: Macular Degeneration


Cardiovascular History: Reports: Heart Murmur, High Cholesterol, Hypertension


Gastrointestinal History: Reports: GERD, Hiatal Hernia


Endocrine/Metabolic History: Reports: Hypothyroidism, Osteoporosis





- Infectious Disease History


Infectious Disease History: Reports: Chicken Pox, Measles, Pertussis (Whooping 

Cough)





- Past Surgical History


Head Surgeries/Procedures: Reports: None


Musculoskeletal Surgical History: Reports: Knee Replacement, Shoulder Surgery


Other Musculoskeletal Surgeries/Procedures:: Knee replacements 12/10 & 4/13, 

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





Social & Family History





- Family History


Family Medical History: No Pertinent Family History





- Caffeine Use


Caffeine Use: Reports: None





ED ROS GENERAL





- Review of Systems


Review Of Systems: See Below


Constitutional: Reports: No Symptoms


HEENT: Reports: No Symptoms


Respiratory: Reports: No Symptoms


Cardiovascular: Reports: No Symptoms


Endocrine: Reports: No Symptoms


GI/Abdominal: Reports: Abdominal Pain (Mid epigastric), Nausea.  Denies: Black 

Stool, Bloody Stool, Constipation, Diarrhea, Distension, Vomiting


: Denies: Discharge, Dysuria, Frequency, Hematuria


Musculoskeletal: Reports: No Symptoms


Skin: Reports: No Symptoms


Neurological: Reports: No Symptoms


Psychiatric: Reports: No Symptoms


Hematologic/Lymphatic: Reports: No Symptoms


Immunologic: Reports: No Symptoms





ED EXAM, GI/ABD





- Physical Exam


Exam: See Below


Exam Limited By: No Limitations


General Appearance: Alert, WD/WN, No Apparent Distress


Eyes: Bilateral: Normal Appearance


Ears: Hearing Grossly Normal


Nose: Normal Inspection


Throat/Mouth: Normal Inspection, Normal Oropharynx, Normal Voice, No Airway 

Compromise


Head: Atraumatic, Normocephalic


Neck: Normal Inspection, Supple, Non-Tender, Full Range of Motion


Respiratory/Chest: No Respiratory Distress, Lungs Clear, Normal Breath Sounds, 

No Accessory Muscle Use, Chest Non-Tender


Cardiovascular: Regular Rate, Rhythm, No Edema, No JVD


GI/Abdominal Exam: Normal Bowel Sounds, Soft, No Organomegaly, No Distention, 

Tender (Midepigastric).  No: Guarding, Rigid, Rebound


  ** #1 Interpretation


EKG Date: 11/06/21


Time: 11:12


Rhythm: NSR


Rate (Beats/Min): 79


Axis: Normal


QRS: Normal


ST-T: Normal


QT: Prolonged


Comparison: NA - No Prior EKG


EKG Interpretation Comments: 





Normal sinus rhythm


Possible left atrial enlargement


Anterior infarct, age undetermined


Prolonged QT


Abnormal ECG





Course





- Vital Signs


Last Recorded V/S: 


                                Last Vital Signs











Temp  97.1 F   11/06/21 09:10


 


Pulse  67   11/06/21 09:10


 


Resp  14   11/06/21 09:10


 


BP  180/87 H  11/06/21 09:10


 


Pulse Ox  96   11/06/21 09:10














- Orders/Labs/Meds


Orders: 


                               Active Orders 24 hr











 Category Date Time Status


 


 EKG Documentation Completion [RC] ASDIRECTED Care  11/06/21 10:32 Active


 


 CORONAVIRUS COVID-19 RAPID [MOLEC] Stat Lab  11/06/21 11:12 Ordered


 


 Promethazine [Phenergan] 12.5 mg Med  11/06/21 09:15 Active





 Sodium Chloride 0.9% [Normal Saline] 100 ml   





 IV Q6H   


 


 Sodium Chloride 0.9% [Normal Saline] 1,000 ml Med  11/06/21 10:00 Active





 IV ASDIRECTED   


 


 Sodium Chloride 0.9% [Normal Saline] 50 ml Med  11/06/21 10:15 Active





 IV ASDIRECTED   


 


 EKG 12 Lead [EK] Urgent Ther  11/06/21 10:32 Ordered








                                Medication Orders





Promethazine HCl 12.5 mg/ (Sodium Chloride)  100.5 mls @ 400 mls/hr IV Q6H JALEN


   Last Admin: 11/06/21 10:25  Dose: 400 mls/hr


   Documented by: ABEL


Sodium Chloride (Normal Saline)  1,000 mls @ 500 mls/hr IV ASDIRECTED JALEN


Sodium Chloride (Normal Saline)  50 mls @ 200 mls/hr IV ASDIRECTED JALEN


   Last Admin: 11/06/21 10:32  Dose: 200 mls/hr


   Documented by: STEFFI








Labs: 


                                Laboratory Tests











  11/06/21 11/06/21 Range/Units





  08:57 09:10 


 


WBC   9.96  (5.00-10.00)  10^3/uL


 


RBC   4.33  (3.80-5.50)  10^6/uL


 


Hgb   12.7  (12.0-16.0)  g/dL


 


Hct   39.3  (37.0-47.0)  %


 


MCV   90.8  (82.0-92.0)  fL


 


MCH   29.3  (27.0-31.0)  pg


 


MCHC   32.3  (32.0-36.0)  g/dL


 


RDW   13.0  (11.5-14.5)  %


 


Plt Count   181  (150-400)  10^3/uL


 


MPV   8.7  (7.4-10.4)  fL


 


Add Manual Diff   Yes  


 


Neutrophils % (Manual)   96 H  (50-70)  %


 


Lymphocytes % (Manual)   2 L  (20-40)  %


 


Monocytes % (Manual)   2  (2-8)  %


 


Toxic Granulation   1+ slight  


 


Sodium  141   (136-145)  mmol/L


 


Potassium  4.1   (3.5-5.1)  mmol/L


 


Chloride  101   ()  mmol/L


 


Carbon Dioxide  26.4   (21.0-32.0)  mmol/L


 


Anion Gap  17.7 H   (5-15)  mmol/L


 


BUN  17   (7-18)  mg/dL


 


Creatinine  0.78   (0.51-1.17)  mg/dL


 


Est Cr Clr Drug Dosing  35.12   mL/min


 


Estimated GFR (MDRD)  > 60   mL/min


 


Glucose  170 H   ()  mg/dL


 


Calcium  10.1   (8.7-10.3)  mg/dL


 


Total Bilirubin  0.6   (0.2-1.0)  mg/dL


 


AST  47 H   (15-37)  U/L


 


ALT  49   (14-63)  U/L


 


Alkaline Phosphatase  222 H   ()  U/L


 


Total Protein  6.9   (6.4-8.2)  g/dL


 


Albumin  3.48   (3.40-5.00)  g/dL











Meds: 


Medications











Generic Name Dose Route Start Last Admin





  Trade Name Satishq  PRN Reason Stop Dose Admin


 


Promethazine HCl 12.5 mg/  100.5 mls @ 400 mls/hr  11/06/21 09:15  11/06/21 

10:25





  Sodium Chloride  IV   400 mls/hr





  Q6H JALEN   Administration


 


Sodium Chloride  1,000 mls @ 500 mls/hr  11/06/21 10:00 





  Normal Saline  IV  





  ASDIRECTED JALEN  


 


Sodium Chloride  50 mls @ 200 mls/hr  11/06/21 10:15  11/06/21 10:32





  Normal Saline  IV   200 mls/hr





  ASDIRECTED JALEN   Administration














Discontinued Medications














Generic Name Dose Route Start Last Admin





  Trade Name Freq  PRN Reason Stop Dose Admin


 


Pantoprazole Sodium 40 mg/  100 mls @ 200 mls/hr  11/06/21 09:21  11/06/21 09:46





  Sodium Chloride  IV  11/06/21 09:50  200 mls/hr





  ONETIME ONE   Administration


 


Sodium Chloride  Confirm  11/06/21 09:28  11/06/21 09:45





  Normal Saline  Administered  11/06/21 09:29  500 mls/hr





  Dose   Administration





  1,000 mls @ as directed  





  .ROUTE  





  .STK-MED ONE  


 


Iopamidol  75 ml  11/06/21 10:06  11/06/21 10:32





  Iopamidol 755 Mg/Ml 75 Ml Bottle  IVPUSH  11/06/21 10:07  75 ml





  ONETIME ONE   Administration


 


Morphine Sulfate  1 mg  11/06/21 09:50  11/06/21 10:20





  Morphine 2 Mg/Ml Syringe  IVPUSH  11/06/21 09:51  1 mg





  ONETIME ONE   Administration














- Radiology Interpretation


Free Text/Narrative:: 





CT abdomen pelvis with IV contrast





Clinical data: History of gastritis, epigastric pain





Comparison: none





Findings:





Large hiatal hernia.





There are numerous fluid distended loops of small bowel with transition point in

 the right hemipelvis.  Additionally there is free fluid.  The loops of small 

bowel measure up to 3.6 cm.  The distal bowel is relatively decompressed.





The liver, spleen, gallbladder, adrenal glands, and pancreas are unremarkable.  

Bilateral nonobstructing renal calculi.  No hydronephrosis or hydroureter.





Scattered changes of spondylosis of the spine no fracture or osseous lesion.











Impression:





Mall bowel obstruction with transition point in the right hemipelvis.  There is 

associated free fluid.  No free air or fluid collection.


CT Results Date: 11/06/21


CT Results Time: 10:45





- Re-Assessments/Exams


Free Text/Narrative Re-Assessment/Exam: 





11/06/21 10:18


Was given 1 mg of IV morphine she reports her pain had significantly improved. 

Her blood pressure came down nicely. She is receiving IV fluids. She was given 4

 mg of IV Zofran.


11/06/21 11:44


Patient's nausea has remained resolved with the IV Zofran.  I have discussed the

 CT abdominal findings showing a small bowel obstruction.  She understands we 

will admit her to the hospital and be placed on bowel rest n.p.o.  She is 

nontoxic-appearing at this time.





Departure





- Departure


Time of Disposition: 11:45


Disposition: Admitted As Inpatient 66


Condition: Fair


Clinical Impression: 


 Small bowel obstruction





Abdominal pain


Qualifiers:


 Abdominal location: epigastric Qualified Code(s): R10.13 - Epigastric pain








- Discharge Information


Referrals: 


Giselle Argueta MD [Primary Care Provider] - 


Forms:  ED Department Discharge





Sepsis Event Note (ED)





- Evaluation


Sepsis Screening Result: No Definite Risk





- Focused Exam


Vital Signs: 


                                   Vital Signs











  Temp Pulse Resp BP Pulse Ox


 


 11/06/21 09:10  97.1 F  67  14  180/87 H  96














- My Orders


Last 24 Hours: 


My Active Orders





11/06/21 09:15


Promethazine [Phenergan] 12.5 mg   Sodium Chloride 0.9% [Normal Saline] 100 ml 

IV Q6H 





11/06/21 10:00


Sodium Chloride 0.9% [Normal Saline] 1,000 ml IV ASDIRECTED 





11/06/21 10:15


Sodium Chloride 0.9% [Normal Saline] 50 ml IV ASDIRECTED 





11/06/21 10:32


EKG Documentation Completion [RC] ASDIRECTED 


EKG 12 Lead [EK] Urgent 





11/06/21 11:12


CORONAVIRUS COVID-19 RAPID [MOLEC] Stat 














- Assessment/Plan


Last 24 Hours: 


My Active Orders





11/06/21 09:15


Promethazine [Phenergan] 12.5 mg   Sodium Chloride 0.9% [Normal Saline] 100 ml 

IV Q6H 





11/06/21 10:00


Sodium Chloride 0.9% [Normal Saline] 1,000 ml IV ASDIRECTED 





11/06/21 10:15


Sodium Chloride 0.9% [Normal Saline] 50 ml IV ASDIRECTED 





11/06/21 10:32


EKG Documentation Completion [RC] ASDIRECTED 


EKG 12 Lead [EK] Urgent 





11/06/21 11:12


CORONAVIRUS COVID-19 RAPID [MOLEC] Stat 











Assessment:: 





Small bowel obstruction


Plan: 





1.  Patient will be admitted to  at the [facility] 

for small bowel obstruction.


2.  Will be made n.p.o.


3.  IV fluids are running.

## 2021-11-06 NOTE — CT
______________________________________________________________________________   

  

[id number] CT/CT Abdomen Pelvis W IV  

EXAM: CT Abdomen Pelvis W IV  

   

 CLINICAL DATA: HISTORY OF GASTRITIS,EPIGASTRIC PAIN.  

   

 COMPARISON STUDY: None.  

   

 FINDINGS:  

   

 Large hiatal hernia.   

   

 There are numerous fluid distended loops of small bowel with transition point in  

 the right hemipelvis. Additionally there is free fluid. The loops of small bowel  

 measure up to 3.6 cm. The distal bowel is relatively decompressed.  

   

 The liver, spleen, gallbladder, adrenal glands, and pancreas are unremarkable.  

 Bilateral nonobstructing renal calculi. No hydronephrosis or hydroureter.  

   

 Scattered changes of spondylosis the spine. No fracture or osseous lesion.  

   

 IMPRESSION:  

   

 1. Small bowel obstruction with transition point in the right hemipelvis. There  

 is associated free fluid. No free air or fluid collection.  

   

 Electronically signed by Tanvir Lam MD on 11/6/2021 10:50 AM  

   

  

Tanvir Lam DO                 

 11/06/21 1052    

  

Thank you for allowing us to participate in the care of your patient.

## 2021-11-07 LAB
ANION GAP SERPL CALC-SCNC: 13.5 MMOL/L (ref 5–15)
CHLORIDE SERPL-SCNC: 107 MMOL/L (ref 98–107)
SODIUM SERPL-SCNC: 142 MMOL/L (ref 136–145)

## 2021-11-07 RX ADMIN — SODIUM CHLORIDE SCH: 9 INJECTION, SOLUTION INTRAVENOUS at 11:02

## 2021-11-07 RX ADMIN — SODIUM CHLORIDE SCH: 9 INJECTION, SOLUTION INTRAVENOUS at 06:06

## 2021-11-07 NOTE — PCM.PN
- General Info


Date of Service: 11/07/21


Subjective Update: 


Patient sitting up in recliner this morning with her  present.  Patient 

states she feels better, abdominal symptoms have improved, she is passing flatus

and had a small liquid bowel movement overnight.


Functional Status: Reports: Pain Controlled, Urinating.  Denies: New Symptoms





- Review of Systems


General: Reports: No Symptoms


HEENT: Reports: No Symptoms


Pulmonary: Reports: No Symptoms


Cardiovascular: Reports: No Symptoms


Gastrointestinal: Reports: Flatus.  Denies: Abdominal Pain, Nausea, Vomiting


Genitourinary: Reports: No Symptoms


Musculoskeletal: Reports: No Symptoms


Skin: Reports: No Symptoms


Neurological: Reports: No Symptoms


Psychiatric: Reports: No Symptoms





- Patient Data


Vitals - Most Recent: 


                                Last Vital Signs











Temp  97.5 F   11/07/21 06:19


 


Pulse  73   11/07/21 06:19


 


Resp  18   11/07/21 06:19


 


BP  137/67   11/07/21 06:19


 


Pulse Ox  95   11/07/21 06:19











Weight - Most Recent: 120 lb 5 oz


I&O - Last 24 Hours: 


                                 Intake & Output











 11/06/21 11/07/21 11/07/21





 23:59 06:59 14:59


 


Intake Total   


 


Balance   











Lab Results Last 24 Hours: 


                         Laboratory Results - last 24 hr











  11/06/21 11/06/21 11/06/21 Range/Units





  11:39 11:54 12:17 


 


WBC     (5.00-10.00)  10^3/uL


 


RBC     (3.80-5.50)  10^6/uL


 


Hgb     (12.0-16.0)  g/dL


 


Hct     (37.0-47.0)  %


 


MCV     (82.0-92.0)  fL


 


MCH     (27.0-31.0)  pg


 


MCHC     (32.0-36.0)  g/dL


 


RDW     (11.5-14.5)  %


 


Plt Count     (150-400)  10^3/uL


 


MPV     (7.4-10.4)  fL


 


Sodium     (136-145)  mmol/L


 


Potassium     (3.5-5.1)  mmol/L


 


Chloride     ()  mmol/L


 


Carbon Dioxide     (21.0-32.0)  mmol/L


 


Anion Gap     (5-15)  mmol/L


 


BUN     (7-18)  mg/dL


 


Creatinine     (0.51-1.17)  mg/dL


 


Est Cr Clr Drug Dosing     mL/min


 


Estimated GFR (MDRD)     mL/min


 


Glucose     ()  mg/dL


 


Calcium     (8.7-10.3)  mg/dL


 


Total Bilirubin     (0.2-1.0)  mg/dL


 


AST     (15-37)  U/L


 


ALT     (14-63)  U/L


 


Alkaline Phosphatase     ()  U/L


 


C-Reactive Protein    2.0 H  (0.0-0.9)  mg/dL


 


Total Protein     (6.4-8.2)  g/dL


 


Albumin     (3.40-5.00)  g/dL


 


Lipase     ()  U/L


 


Specimen Type   Urincc   


 


Urine Color   Yellow   (YELLOW)  


 


Urine Appearance   Clear   (CLEAR)  


 


Urine pH   7.0   (5.0-9.0)  


 


Ur Specific Gravity   1.015   (1.005-1.030)  


 


Urine Protein   Negative   (NEGATIVE)  mg/dL


 


Urine Glucose (UA)   Negative   (NEGATIVE)  mg/dL


 


Urine Ketones   Negative   (NEGATIVE)  mg/dL


 


Urine Occult Blood   Trace-intact H   (NEGATIVE)  


 


Urine Nitrite   Negative   (NEGATIVE)  


 


Urine Bilirubin   Negative   (NEGATIVE)  


 


Urine Urobilinogen   0.2   (0.2-1.0)  E.U./dL


 


Ur Leukocyte Esterase   Negative   (NEGATIVE)  


 


Urine RBC   5-10 H   (0-5)  /HPF


 


Urine WBC   0-5   (0-5)  /HPF


 


Ur Epithelial Cells   Rare   /LPF


 


Urine Bacteria   Not seen   (NONE TO FEW)  /HPF


 


SARS CoV-2 RNA Rapid COLLIN  Negative    (NEGATIVE)  














  11/06/21 11/07/21 11/07/21 Range/Units





  12:31 06:55 06:55 


 


WBC   9.35   (5.00-10.00)  10^3/uL


 


RBC   3.85   (3.80-5.50)  10^6/uL


 


Hgb   11.4 L   (12.0-16.0)  g/dL


 


Hct   34.8 L   (37.0-47.0)  %


 


MCV   90.4   (82.0-92.0)  fL


 


MCH   29.6   (27.0-31.0)  pg


 


MCHC   32.8   (32.0-36.0)  g/dL


 


RDW   13.0   (11.5-14.5)  %


 


Plt Count   191   (150-400)  10^3/uL


 


MPV   9.1   (7.4-10.4)  fL


 


Sodium    142  (136-145)  mmol/L


 


Potassium    3.4 L  (3.5-5.1)  mmol/L


 


Chloride    107  ()  mmol/L


 


Carbon Dioxide    24.9  (21.0-32.0)  mmol/L


 


Anion Gap    13.5  (5-15)  mmol/L


 


BUN    17  (7-18)  mg/dL


 


Creatinine    0.78  (0.51-1.17)  mg/dL


 


Est Cr Clr Drug Dosing    35.12  mL/min


 


Estimated GFR (MDRD)    > 60  mL/min


 


Glucose    103  ()  mg/dL


 


Calcium    7.8 L D  (8.7-10.3)  mg/dL


 


Total Bilirubin    0.7  (0.2-1.0)  mg/dL


 


AST    31  (15-37)  U/L


 


ALT    35  (14-63)  U/L


 


Alkaline Phosphatase    146 H  ()  U/L


 


C-Reactive Protein     (0.0-0.9)  mg/dL


 


Total Protein    5.4 L  (6.4-8.2)  g/dL


 


Albumin    2.56 L  (3.40-5.00)  g/dL


 


Lipase  79    ()  U/L


 


Specimen Type     


 


Urine Color     (YELLOW)  


 


Urine Appearance     (CLEAR)  


 


Urine pH     (5.0-9.0)  


 


Ur Specific Gravity     (1.005-1.030)  


 


Urine Protein     (NEGATIVE)  mg/dL


 


Urine Glucose (UA)     (NEGATIVE)  mg/dL


 


Urine Ketones     (NEGATIVE)  mg/dL


 


Urine Occult Blood     (NEGATIVE)  


 


Urine Nitrite     (NEGATIVE)  


 


Urine Bilirubin     (NEGATIVE)  


 


Urine Urobilinogen     (0.2-1.0)  E.U./dL


 


Ur Leukocyte Esterase     (NEGATIVE)  


 


Urine RBC     (0-5)  /HPF


 


Urine WBC     (0-5)  /HPF


 


Ur Epithelial Cells     /LPF


 


Urine Bacteria     (NONE TO FEW)  /HPF


 


SARS CoV-2 RNA Rapid COLLIN     (NEGATIVE)  











Med Orders - Current: 


                               Current Medications





Acetaminophen (Acetaminophen 325 Mg Tab)  650 mg PO Q4H PRN


   PRN Reason: Pain


   Last Admin: 11/07/21 03:23 Dose:  650 mg


   Documented by: 


Artificial Tears (Carboxymethylcellulose Sodium 0.5% Ophth Soln 15 Ml Bottle)  0

ml EYEBOTH Q1H PRN


   PRN Reason: dryeyes


Sodium Chloride (Normal Saline)  50 mls @ 200 mls/hr IV ASDIRECTED [facility]


   Last Admin: 11/06/21 10:32 Dose:  200 mls/hr


   Documented by: 


Sodium Chloride (Normal Saline)  1,000 mls @ 100 mls/hr IV ASDIRECTED [facility]


   Last Admin: 11/07/21 09:55 Dose:  100 mls/hr


   Documented by: 


Levothyroxine Sodium (Levothyroxine 50 Mcg Tab)  50 mcg PO ACBREAKFAST [facility]


   Last Admin: 11/07/21 06:32 Dose:  Not Given


   Documented by: 


Morphine Sulfate (Morphine 2 Mg/Ml Syringe)  1 mg IVPUSH Q4H PRN


   PRN Reason: Abdominal Pain


   Last Admin: 11/06/21 20:53 Dose:  1 mg


   Documented by: 


Ondansetron HCl (Ondansetron 4 Mg/2 Ml Sdv)  4 mg IVPUSH Q6H PRN


   PRN Reason: Nausea/Vomiting


Pantoprazole Sodium (Pantoprazole 40 Mg Vial)  40 mg IVPUSH Q12H [facility]


   Last Admin: 11/07/21 08:45 Dose:  40 mg


   Documented by: 


Ropinirole HCl (Ropinirole 1 Mg Tab)  2 mg PO 1200,1800,2100 [facility]


   Last Admin: 11/06/21 20:52 Dose:  2 mg


   Documented by: 





Discontinued Medications





Bisacodyl (Bisacodyl 10 Mg Supp)  10 mg RECTAL ONETIME ONE


   Stop: 11/06/21 13:31


   Last Admin: 11/06/21 15:03 Dose:  10 mg


   Documented by: 


Promethazine HCl 12.5 mg/ (Sodium Chloride)  100.5 mls @ 400 mls/hr IV Q6H [facility]


   Last Admin: 11/07/21 06:06 Dose:  Not Given


   Documented by: 


Pantoprazole Sodium 40 mg/ (Sodium Chloride)  100 mls @ 200 mls/hr IV ONETIME 

ONE


   Stop: 11/06/21 09:50


   Last Admin: 11/06/21 09:46 Dose:  200 mls/hr


   Documented by: 


Sodium Chloride (Normal Saline) Confirm Administered Dose 1,000 mls @ as 

directed .ROUTE .STK-MED ONE


   Stop: 11/06/21 09:29


   Last Admin: 11/06/21 09:45 Dose:  500 mls/hr


   Documented by: 


Sodium Chloride (Normal Saline)  1,000 mls @ 500 mls/hr IV ASDIRECTED [facility]


Promethazine HCl 12.5 mg/ (Sodium Chloride)  100.5 mls @ 400 mls/hr IV Q6H PRN


   PRN Reason: Nausea


Iopamidol (Iopamidol 755 Mg/Ml 75 Ml Bottle)  75 ml IVPUSH ONETIME ONE


   Stop: 11/06/21 10:07


   Last Admin: 11/06/21 10:32 Dose:  75 ml


   Documented by: 


Morphine Sulfate (Morphine 2 Mg/Ml Syringe)  1 mg IVPUSH ONETIME ONE


   Stop: 11/06/21 09:51


   Last Admin: 11/06/21 10:20 Dose:  1 mg


   Documented by: 


Potassium Chloride (Potassium Chloride 20 Meq Tab.Er)  20 meq PO ONETIME ONE


   Stop: 11/07/21 09:38


   Last Admin: 11/07/21 09:55 Dose:  20 meq


   Documented by: 











- Exam


Quality Assessment: No: Supplemental Oxygen


General: Alert, Oriented, Cooperative, No Acute Distress


HEENT: Pupils Equal, Mucous Membr. Moist/Pink


Neck: Supple, Trachea Midline


Lungs: Clear to Auscultation, Normal Respiratory Effort


Cardiovascular: Regular Rate, Regular Rhythm, Murmurs


GI/Abdominal Exam: Normal Bowel Sounds, Soft, Non-Tender (mild tenderness, 

improved from yesterday), No Distention.  No: Guarding, Rigid, Rebound


 (Female) Exam: Deferred


Back Exam: Normal Inspection, Full Range of Motion


Extremities: Normal Inspection, Normal Range of Motion, Non-Tender, No Pedal 

Edema, Normal Capillary Refill


Peripheral Pulses: 2+: Dorsalis Pedis (L), Dorsalis Pedis (R)


Skin: Warm, Dry, Intact


Neurological: No New Focal Deficit


Psy/Mental Status: Alert, Normal Affect, Normal Mood





- Patient Data


Lab Results Last 24 hrs: 


                         Laboratory Results - last 24 hr











  11/06/21 11/06/21 11/06/21 Range/Units





  11:39 11:54 12:17 


 


WBC     (5.00-10.00)  10^3/uL


 


RBC     (3.80-5.50)  10^6/uL


 


Hgb     (12.0-16.0)  g/dL


 


Hct     (37.0-47.0)  %


 


MCV     (82.0-92.0)  fL


 


MCH     (27.0-31.0)  pg


 


MCHC     (32.0-36.0)  g/dL


 


RDW     (11.5-14.5)  %


 


Plt Count     (150-400)  10^3/uL


 


MPV     (7.4-10.4)  fL


 


Sodium     (136-145)  mmol/L


 


Potassium     (3.5-5.1)  mmol/L


 


Chloride     ()  mmol/L


 


Carbon Dioxide     (21.0-32.0)  mmol/L


 


Anion Gap     (5-15)  mmol/L


 


BUN     (7-18)  mg/dL


 


Creatinine     (0.51-1.17)  mg/dL


 


Est Cr Clr Drug Dosing     mL/min


 


Estimated GFR (MDRD)     mL/min


 


Glucose     ()  mg/dL


 


Calcium     (8.7-10.3)  mg/dL


 


Total Bilirubin     (0.2-1.0)  mg/dL


 


AST     (15-37)  U/L


 


ALT     (14-63)  U/L


 


Alkaline Phosphatase     ()  U/L


 


C-Reactive Protein    2.0 H  (0.0-0.9)  mg/dL


 


Total Protein     (6.4-8.2)  g/dL


 


Albumin     (3.40-5.00)  g/dL


 


Lipase     ()  U/L


 


Specimen Type   Urincc   


 


Urine Color   Yellow   (YELLOW)  


 


Urine Appearance   Clear   (CLEAR)  


 


Urine pH   7.0   (5.0-9.0)  


 


Ur Specific Gravity   1.015   (1.005-1.030)  


 


Urine Protein   Negative   (NEGATIVE)  mg/dL


 


Urine Glucose (UA)   Negative   (NEGATIVE)  mg/dL


 


Urine Ketones   Negative   (NEGATIVE)  mg/dL


 


Urine Occult Blood   Trace-intact H   (NEGATIVE)  


 


Urine Nitrite   Negative   (NEGATIVE)  


 


Urine Bilirubin   Negative   (NEGATIVE)  


 


Urine Urobilinogen   0.2   (0.2-1.0)  E.U./dL


 


Ur Leukocyte Esterase   Negative   (NEGATIVE)  


 


Urine RBC   5-10 H   (0-5)  /HPF


 


Urine WBC   0-5   (0-5)  /HPF


 


Ur Epithelial Cells   Rare   /LPF


 


Urine Bacteria   Not seen   (NONE TO FEW)  /HPF


 


SARS CoV-2 RNA Rapid COLLIN  Negative    (NEGATIVE)  














  11/06/21 11/07/21 11/07/21 Range/Units





  12:31 06:55 06:55 


 


WBC   9.35   (5.00-10.00)  10^3/uL


 


RBC   3.85   (3.80-5.50)  10^6/uL


 


Hgb   11.4 L   (12.0-16.0)  g/dL


 


Hct   34.8 L   (37.0-47.0)  %


 


MCV   90.4   (82.0-92.0)  fL


 


MCH   29.6   (27.0-31.0)  pg


 


MCHC   32.8   (32.0-36.0)  g/dL


 


RDW   13.0   (11.5-14.5)  %


 


Plt Count   191   (150-400)  10^3/uL


 


MPV   9.1   (7.4-10.4)  fL


 


Sodium    142  (136-145)  mmol/L


 


Potassium    3.4 L  (3.5-5.1)  mmol/L


 


Chloride    107  ()  mmol/L


 


Carbon Dioxide    24.9  (21.0-32.0)  mmol/L


 


Anion Gap    13.5  (5-15)  mmol/L


 


BUN    17  (7-18)  mg/dL


 


Creatinine    0.78  (0.51-1.17)  mg/dL


 


Est Cr Clr Drug Dosing    35.12  mL/min


 


Estimated GFR (MDRD)    > 60  mL/min


 


Glucose    103  ()  mg/dL


 


Calcium    7.8 L D  (8.7-10.3)  mg/dL


 


Total Bilirubin    0.7  (0.2-1.0)  mg/dL


 


AST    31  (15-37)  U/L


 


ALT    35  (14-63)  U/L


 


Alkaline Phosphatase    146 H  ()  U/L


 


C-Reactive Protein     (0.0-0.9)  mg/dL


 


Total Protein    5.4 L  (6.4-8.2)  g/dL


 


Albumin    2.56 L  (3.40-5.00)  g/dL


 


Lipase  79    ()  U/L


 


Specimen Type     


 


Urine Color     (YELLOW)  


 


Urine Appearance     (CLEAR)  


 


Urine pH     (5.0-9.0)  


 


Ur Specific Gravity     (1.005-1.030)  


 


Urine Protein     (NEGATIVE)  mg/dL


 


Urine Glucose (UA)     (NEGATIVE)  mg/dL


 


Urine Ketones     (NEGATIVE)  mg/dL


 


Urine Occult Blood     (NEGATIVE)  


 


Urine Nitrite     (NEGATIVE)  


 


Urine Bilirubin     (NEGATIVE)  


 


Urine Urobilinogen     (0.2-1.0)  E.U./dL


 


Ur Leukocyte Esterase     (NEGATIVE)  


 


Urine RBC     (0-5)  /HPF


 


Urine WBC     (0-5)  /HPF


 


Ur Epithelial Cells     /LPF


 


Urine Bacteria     (NONE TO FEW)  /HPF


 


SARS CoV-2 RNA Rapid COLLIN     (NEGATIVE)  











Result Diagrams: 


                                 11/07/21 06:55





                                 11/07/21 06:55





Sepsis Event Note





- Evaluation


Sepsis Screening Result: No Definite Risk





- Focused Exam


Vital Signs: 


                                   Vital Signs











  Temp Pulse Resp BP Pulse Ox


 


 11/07/21 06:19  97.5 F  73  18  137/67  95


 


 11/07/21 03:00  97.8 F  79  18  165/89 H  93 L














- Problem List Review


Problem List Initiated/Reviewed/Updated: Yes





- My Orders


Last 24 Hours: 


My Active Orders





11/06/21 12:17


Intake and Output [RC] 1400,2200,0600 


Oxygen Therapy [RC] PRN 


Up With Assistance [RC] ASDIRECTED 


VTE/DVT Education [RC] PER UNIT ROUTINE 





11/06/21 12:32


Morphine   1 mg IVPUSH Q4H PRN 





11/06/21 12:33


Carboxymethylcellulose Sodium [Refresh Tears 0.5%]   0 ml EYEBOTH Q1H PRN 





11/06/21 12:45


Sodium Chloride 0.9% [Normal Saline] 1,000 ml IV ASDIRECTED 





11/06/21 Dinner


Nothing per Oral Now Diet [DIET] 


rOPINIRole [Requip]   2 mg PO 1200,1800,2100 





11/06/21 21:00


Pantoprazole [ProTONIX IV***]   40 mg IVPUSH Q12H 





11/07/21 03:16


Acetaminophen [TylenoL]   650 mg PO Q4H PRN 





11/07/21 07:30


Levothyroxine [Synthroid]   50 mcg PO ACBREAKFAST 





11/07/21 09:36


Ondansetron [Zofran]   4 mg IVPUSH Q6H PRN 





11/08/21 05:11


CBC WITH AUTO DIFF [HEME] AM 


CMP [COMPREHENSIVE METABOLIC PN,CMP] [CHEM] AM 


CRP [C-REACTIVE PROTEIN] [CHEM] AM 


MG [MAGNESIUM] [CHEM] AM 














- Plan


Plan:: 


HPI summary: Linsey is an 89yF patient who presented to the ER this morning by 

private vehicle for c/o midepigastric pain with onset around 0400 this am.  

Patient has known history of GERD, she takes pantoprazole BID and took a GI 

cocktail this morning which she takes at home PRN.  Patient c/o nausea, dry 

heaving, no vomiting.  No diarrhea, fever or chills.  Denies flatus, however re

ports a BM this am.  





ED course: BP elevated initially, improved with morphine.  Abdominal CT 

indicates numerous fluid filled distended loops of small bowel with transition p

oint in the R hemipelvis.  Loops of bowel measure up to 3.6cm.  Liver, spleen, 

gallbladder and pancreas unremarkable.  NS 500ml given, 1mg morphine sulfate for

 abdominal pain, phenergan for nausea.  Patient admitted to inpatient status for

 SBO on NPO status for bowel rest.  No indication for NG placement as symptoms 

rather mild, no vomiting or significant abdominal distension.   





Hospital course: 


11/6/21:  Nausea improved with anti-emetic in ER, generalized abdominal pain 

improved with morphine in ER.  Reports dry heaves, no vomiting.  No flatus, BM 

this morning at 0300 which was formed per patient.  Denies chills, fever.  Bowel

 sounds hyperactive, abdomen soft, mildly tender to palpation, no significant 

distension.  WBC 9.96, Hgb 12.7, Plt 181.  Na 141, K 4.1, BUN 17, Crt 0.78, AST 

47, Alk phos 222.  Lipase 79, CRP 2.0.  Will hold off on NG tube unless symptoms

 worsen including vomiting, increased abdominal pain or distension. 





11/7/21:  Passing flatus, denies abdominal pain with no morphine since last 

evening, small liquid bowel movement, denies nausea.  Bowel sounds less 

hyperactive, abdomen soft, mildly tender though improved.  Will allow for 

minimal clear liquids this morning given improvement in symptoms, however 

advised patient to start slowly to prevent increased symptoms.  Vitals stable, 

blood pressure normalized, remains afebrile.  WBC 9.35, Hgb 11.4, Plt 191.  Na 

142, K 3.4, Ca 7.8, Alk phos 146, Albumin 2.56.   





Hospitalization problems and plan:


# Small bowel obstruction  - hx of diverticulosis - no evidence of 

diverticulitis on CT, CRP without significant elevation, No leukocytosis.  

Initial conservative management to be initiated with consideration of NG tube 

and possible surgical consultation for any concern of deteriorating clinical 

condition, particularly given hx of diverticulosis, though no evidence of 

diverticulitis identified on CT. 


   - Will cautiously advance diet to clears this morning 


   - Continue NS @ 100ml/hr 


   - Will continue to hold most of PO medications and restart as diet advanced 

and tolerated 


   - Morphine 1mg IV Q4H PRN abdominal pain


   - Pantoprazole 40mg IV BID 


   - Stopped phenergan as ordered from ER - Zofran 4mg IV PRN nausea   


   - Repeat labs in am:  CBC, CMP, CRP 





Chronic, stable conditions:


# Aortic stenosis - moderate


# Tricuspid regurgitation - mild to moderate


# HLD - takes atorvastatin 40mg PO daily, Lovaza 1gm BID (hold)


# Hypothyroidism - continue levothyroxine 50mcg PO daily


# Hypokalemia - takes micro K 10mEq daily (on hold); K 3.4 today PO 

supplementation ordered 


# Insomnia - takes trazodone 150mg PO HS (on hold)


# RLS - continue requip 2mg PO TID


# Lumbar radiculitis


# Diverticulosis


# Esophageal reflux - takes pepcid 20mg PO daily (HOLD), pantoprazole 40mg PO 

BID (IV formulation in hospital)


# Dysphagia


# Osteoarthritis


# Osteopenia - takes Oscal (HOLD)


# Degenerative disc disease


# Scoliosis of the lumbar spine


# Spondylolithesis - takes hydrocodone 10/325 BID daily hydrocodone, 5/325 PRN 

at HS for severe pain 


# Macular degeneration - continue artificial tears, hold ocuvite


# Pain management contract 





Hospitalization details:


# FEN: NS @ 100ml/hr, mild hypokalemia - PO supplementation, NPO with ice chips


# PPX: Continue pantoprazole 40mg IV BID


# Code status: DNR/DNI - POLST UPDATED 


# Emergency contact: Blake Ho [phone]


# Disposition: Clinical condition improving, possible discharge home tomorrow if

 bowel function normalizes and patient tolerates advanced diet.

## 2021-11-08 LAB
ANION GAP SERPL CALC-SCNC: 15.2 MMOL/L (ref 5–15)
ANION GAP SERPL CALC-SCNC: 19.2 MMOL/L (ref 5–15)
CHLORIDE SERPL-SCNC: 107 MMOL/L (ref 98–107)
CHLORIDE SERPL-SCNC: 108 MMOL/L (ref 98–107)
SODIUM SERPL-SCNC: 141 MMOL/L (ref 136–145)
SODIUM SERPL-SCNC: 145 MMOL/L (ref 136–145)

## 2021-11-08 RX ADMIN — POTASSIUM CHLORIDE ONE MEQ: 750 TABLET, FILM COATED, EXTENDED RELEASE ORAL at 11:04

## 2021-11-08 RX ADMIN — POTASSIUM CHLORIDE ONE MEQ: 750 TABLET, FILM COATED, EXTENDED RELEASE ORAL at 11:59

## 2021-11-08 NOTE — [id number]
**Discharge Summary





- Hospital Course


Free Text/Narrative:: 


Date of admission:  11/6/21





Date of discharge: 11/8/21





Admission diagnoses:


# Small bowel obstruction; resolved.


   


Discharge diagnoses:


# Aortic stenosis - moderate


# Tricuspid regurgitation - mild to moderate


# HLD - takes atorvastatin 40mg PO daily, Lovaza 1gm BID 


# Hypothyroidism - continue levothyroxine 50mcg PO daily


# Hypokalemia - takes micro K 10mEq daily 


# Insomnia - takes trazodone 150mg PO HS 


# RLS - continue requip 2mg PO TID


# Lumbar radiculitis


# Diverticulosis


# Esophageal reflux - takes pepcid 20mg PO daily, pantoprazole 40mg PO BID


# Dysphagia


# Osteoarthritis


# Osteopenia - takes Oscal


# Degenerative disc disease


# Scoliosis of the lumbar spine


# Spondylolithesis - takes hydrocodone 10/325 BID daily hydrocodone, 5/325 PRN 

at HS for severe pain 


# Macular degeneration - continue artificial tears, takes ocuvite 


# Pain management contract 





HPI summary: Linsey is an 89yF patient who presented to the ER this morning by 

private vehicle for c/o midepigastric pain with onset around 0400 this am.  

Patient has known history of GERD, she takes pantoprazole BID and took a GI 

cocktail this morning which she takes at home PRN.  Patient c/o nausea, dry 

heaving, no vomiting.  No diarrhea, fever or chills.  Denies flatus, however 

reports a BM this am.  





ED course: BP elevated initially, improved with morphine.  Abdominal CT 

indicates numerous fluid filled distended loops of small bowel with transition 

point in the R hemipelvis.  Loops of bowel measure up to 3.6cm.  Liver, spleen, 

gallbladder and pancreas unremarkable.  NS 500ml given, 1mg morphine sulfate for

abdominal pain, phenergan for nausea.  Patient admitted to inpatient status for 

SBO on NPO status for bowel rest.  No indication for NG placement as symptoms 

rather mild, no vomiting or significant abdominal distension.   





Hospital course: 


11/6/21:  Nausea improved with anti-emetic in ER, generalized abdominal pain 

improved with morphine in ER.  Reports dry heaves, no vomiting.  No flatus, BM 

this morning at 0300 which was formed per patient.  Denies chills, fever.  Bowel

sounds hyperactive, abdomen soft, mildly tender to palpation, no significant 

distension.  WBC 9.96, Hgb 12.7, Plt 181.  Na 141, K 4.1, BUN 17, Crt 0.78, AST 

47, Alk phos 222.  Lipase 79, CRP 2.0.  Will hold off on NG tube unless symptoms

worsen including vomiting, increased abdominal pain or distension. 





11/7/21:  Passing flatus, denies abdominal pain with no morphine since last 

evening, small liquid bowel movement, denies nausea.  Bowel sounds less 

hyperactive, abdomen soft, mildly tender though improved.  Will allow for 

minimal clear liquids this morning given improvement in symptoms, however 

advised patient to start slowly to prevent increased symptoms.  Vitals stable, 

blood pressure normalized, remains afebrile.  WBC 9.35, Hgb 11.4, Plt 191.  Na 

142, K 3.4, Ca 7.8, Alk phos 146, Albumin 2.56. 





11/8/21:  Patient denies abdominal pain, nausea.  She has had additional loose 

stools and is passing flatus more regularly.  Bowel sounds present, abdomen 

soft, non-tender and without distension.  WBC 8.38, Hgb 12.0.  Na 145, K 2.9, 

BUN 10, Creatinine 0.64, Ca 7.4, Alk phos 128.  K replaced orally with KDur 

10mEq tabs x 2 in three separate doses to normalize K prior to planned 

discharge.  After 60 mEq total K PO, repeat K at 1400 was 4.0.  Symptoms had 

resolved, patient tolerated dietary advancement without recurrence of symptoms, 

vitals stable and labs improved.  Patient discharged home with planned follow up

at the end of the week.  





Discharge and follow-up recommendations:


- Discharge to home per self care 


- New medications at discharge: No medication changes, recommend soft diet with 

advancement as tolerated 


- Follow-up with Dr Argueta at [facility] on 11/12/21 at 1:30 as 

previously scheduled











HPI Initial Comments: 


Patient denies nausea, abdominal pain.  Having loose bowel movements and passing

flatus.








- Discharge Data


Discharge Date: 11/08/21


Discharge Disposition: Home, Self-Care 01


Condition: Good





- Referral to Home Health


Primary Care Physician: 


Giselle Argueta MD








- Patient Instructions


Diet, Other: soft, bland diet until follow up on Friday 


Activity: As Tolerated


Driving: Do Not Drive


Showering/Bathing: May Shower


Notify Provider of: Increased Pain, Nausea and/or Vomiting





- Discharge Plan


*PRESCRIPTION DRUG MONITORING PROGRAM REVIEWED*: Not Applicable


*COPY OF PRESCRIPTION DRUG MONITORING REPORT IN PATIENT YONATAN: Not Applicable


Home Medications: 


                                    Home Meds





Aspirin [Halfprin] 81 mg PO BRK 11/21/17 [History]


Denosumab [Prolia] 60 mg SUBCUT ASDIRECTED 11/21/17 [History]


Levothyroxine [Synthroid] 50 mcg PO ACBREAKFAST 11/21/17 [History]


Omega-3/DHA/Epa/Fish Oil [Omega 3 500 Softgel] 1 each PO BID 11/21/17 [History]


Potassium Chloride 10 meq PO DAILY 11/21/17 [History]


atorvaSTATin [Lipitor] 40 mg PO BEDTIME 11/21/17 [History]


rOPINIRole [Requip] 2 mg PO 1200,1800,2100 11/21/17 [History]


traZODone HCl [Trazodone HCl] 150 mg PO BEDTIME 11/21/17 [History]


Calcium Carbonate/Vitamin D3 [Calcium 500-Vit D3 200 Tablet] 1 each PO DAILY 

11/06/21 [History]


Carboxymethylcellulose Sodium [Artificial Tears] 1 ml EYEBOTH Q1H PRN 11/06/21 

[History]


Famotidine [Pepcid] 20 mg PO DAILY 11/06/21 [History]


Hydrocodone/Acetaminophen [HYDROcodone-Acetaminophen  MG] 1 tab PO BID 

11/06/21 [History]


Hydrocodone/Acetaminophen [HYDROcodone-Acetaminophen 5-325 MG] 1 tab PO BEDTIME 

PRN 11/06/21 [History]


Lutein/Min/Vit C/Vit E Acetate [Ocuvite Lutein] 1 cap PO BIDMEALS 11/06/21 

[History]


Pantoprazole [ProTONIX***] 40 mg PO QPM 11/06/21 [History]








Oxygen Therapy Mode: Room Air


Referrals: 


Giselle Argueta MD [Primary Care Provider] - 11/12/21 1:30 pm (Follow up with 

Dr Argueta on Friday 11/12/21 at 1:30 as previously scheduled.)





- Discharge Summary/Plan Comment


DC Time >30 min.: Yes


Total # of Minutes for Discharge Time: 


35








- General Info


Date of Service: 11/08/21


Functional Status: Reports: Pain Controlled, Tolerating Diet, Ambulating, 

Urinating.  Denies: New Symptoms





- Review of Systems


General: Reports: No Symptoms


HEENT: Reports: Other (macular degeneration)


Pulmonary: Reports: No Symptoms


Cardiovascular: Reports: No Symptoms


Gastrointestinal: Reports: Diarrhea, Flatus.  Denies: Abdominal Pain, Nausea


Genitourinary: Reports: No Symptoms


Musculoskeletal: Reports: No Symptoms


Skin: Reports: No Symptoms


Neurological: Reports: No Symptoms


Psychiatric: Reports: No Symptoms





- Patient Data


Vitals - Most Recent: 


                                Last Vital Signs











Temp  96.1 F L  11/08/21 14:54


 


Pulse  80   11/08/21 14:54


 


Resp  19   11/08/21 14:54


 


BP  149/88 H  11/08/21 14:54


 


Pulse Ox  95   11/08/21 14:54











Weight - Most Recent: 120 lb 5 oz


I&O - Last 24 hours: 


                                 Intake & Output











 11/08/21 11/08/21 11/08/21





 06:59 14:59 22:59


 


Intake Total 802 350 


 


Balance 802 350 











Lab Results - Last 24 hrs: 


                         Laboratory Results - last 24 hr











  11/08/21 11/08/21 11/08/21 Range/Units





  07:00 07:20 14:25 


 


WBC   8.38   (5.00-10.00)  10^3/uL


 


RBC   4.04   (3.80-5.50)  10^6/uL


 


Hgb   12.0   (12.0-16.0)  g/dL


 


Hct   36.1 L   (37.0-47.0)  %


 


MCV   89.4   (82.0-92.0)  fL


 


MCH   29.7   (27.0-31.0)  pg


 


MCHC   33.2   (32.0-36.0)  g/dL


 


RDW   13.0   (11.5-14.5)  %


 


Plt Count   210   (150-400)  10^3/uL


 


MPV   8.7   (7.4-10.4)  fL


 


Immature Gran % (Auto)   0.2   (0.0-5.0)  %


 


Neut % (Auto)   78.4 H   (50.0-70.0)  %


 


Lymph % (Auto)   13.4 L   (20.0-40.0)  %


 


Mono % (Auto)   6.8   (2.0-8.0)  %


 


Eos % (Auto)   1.0   (1.0-3.0)  %


 


Baso % (Auto)   0.2   (0.0-1.0)  %


 


Neut # (Auto)   6.57   (2.50-7.00)  10^3/uL


 


Lymph # (Auto)   1.12   (1.00-4.00)  10^3/uL


 


Mono # (Auto)   0.57   (0.10-0.80)  10^3/uL


 


Eos # (Auto)   0.08 L   (0.10-0.30)  10^3/uL


 


Baso # (Auto)   0.02   (0.00-0.10)  10^3/uL


 


Immature Gran # (Auto)   0.02   (0.00-0.50)  10^3/uL


 


Sodium  145   141  (136-145)  mmol/L


 


Potassium  2.9 L   4.0  (3.5-5.1)  mmol/L


 


Chloride  108 H   107  ()  mmol/L


 


Carbon Dioxide  24.7   18.8 L  (21.0-32.0)  mmol/L


 


Anion Gap  15.2 H   19.2 H  (5-15)  mmol/L


 


BUN  10   11  (7-18)  mg/dL


 


Creatinine  0.64   0.59  (0.51-1.17)  mg/dL


 


Est Cr Clr Drug Dosing  42.80   46.43  mL/min


 


Estimated GFR (MDRD)  > 60   > 60  mL/min


 


Glucose  101   231 H  ()  mg/dL


 


Calcium  7.4 L   7.9 L  (8.7-10.3)  mg/dL


 


Magnesium  1.8    (1.8-2.4)  mg/dL


 


Total Bilirubin  0.4    (0.2-1.0)  mg/dL


 


AST  25    (15-37)  U/L


 


ALT  29    (14-63)  U/L


 


Alkaline Phosphatase  128 H    ()  U/L


 


C-Reactive Protein  2.0 H    (0.0-0.9)  mg/dL


 


Total Protein  5.6 L    (6.4-8.2)  g/dL


 


Albumin  2.69 L    (3.40-5.00)  g/dL











Med Orders - Current: 


                               Current Medications





Acetaminophen (Acetaminophen 325 Mg Tab)  650 mg PO Q4H PRN


   PRN Reason: Pain


   Last Admin: 11/08/21 02:30 Dose:  650 mg


   Documented by: 


Artificial Tears (Carboxymethylcellulose Sodium 0.5% Ophth Soln 15 Ml Bottle)  0

ml EYEBOTH Q1H PRN


   PRN Reason: dryeyes


Levothyroxine Sodium (Levothyroxine 50 Mcg Tab)  50 mcg PO ACBREAKFAST [facility]


   Last Admin: 11/08/21 07:45 Dose:  50 mcg


   Documented by: 


Melatonin (Melatonin 3 Mg Tab)  3 mg PO BEDTIME PRN


   PRN Reason: Insomnia


   Last Admin: 11/08/21 02:29 Dose:  3 mg


   Documented by: 


Morphine Sulfate (Morphine 2 Mg/Ml Syringe)  1 mg IVPUSH Q4H PRN


   PRN Reason: Abdominal Pain


   Last Admin: 11/06/21 20:53 Dose:  1 mg


   Documented by: 


Ondansetron HCl (Ondansetron 4 Mg/2 Ml Sdv)  4 mg IVPUSH Q6H PRN


   PRN Reason: Nausea/Vomiting


Pantoprazole Sodium (Pantoprazole 40 Mg Vial)  40 mg IVPUSH Q12H [facility]


   Last Admin: 11/08/21 09:37 Dose:  40 mg


   Documented by: 


Ropinirole HCl (Ropinirole 1 Mg Tab)  2 mg PO 1200,1800,2100 [facility]


   Last Admin: 11/08/21 11:59 Dose:  2 mg


   Documented by: 





Discontinued Medications





Bisacodyl (Bisacodyl 10 Mg Supp)  10 mg RECTAL ONETIME ONE


   Stop: 11/06/21 13:31


   Last Admin: 11/06/21 15:03 Dose:  10 mg


   Documented by: 


Promethazine HCl 12.5 mg/ (Sodium Chloride)  100.5 mls @ 400 mls/hr IV Q6H [facility]


   Last Admin: 11/07/21 11:02 Dose:  Not Given


   Documented by: 


Pantoprazole Sodium 40 mg/ (Sodium Chloride)  100 mls @ 200 mls/hr IV ONETIME 

ONE


   Stop: 11/06/21 09:50


   Last Admin: 11/06/21 09:46 Dose:  200 mls/hr


   Documented by: 


Sodium Chloride (Normal Saline) Confirm Administered Dose 1,000 mls @ as 

directed .ROUTE .STK-MED ONE


   Stop: 11/06/21 09:29


   Last Admin: 11/06/21 09:45 Dose:  500 mls/hr


   Documented by: 


Sodium Chloride (Normal Saline)  1,000 mls @ 500 mls/hr IV ASDIRECTED JALEN


Sodium Chloride (Normal Saline)  50 mls @ 200 mls/hr IV ASDIRECTED [facility]


   Last Admin: 11/06/21 10:32 Dose:  200 mls/hr


   Documented by: 


Sodium Chloride (Normal Saline)  1,000 mls [facility]


   Last Admin: 11/07/21 09:55 Dose:  100 mls/hr


   Documented by: 


Promethazine HCl 12.5 mg/ (Sodium Chloride)  100.5 mls @ 400 mls/hr IV Q6H PRN


   PRN Reason: Nausea


Sodium Chloride (Normal Saline)  1,000 mls @ 50 [facility]ls[facility]


   Last Admin: 11/08/21 02:15 Dose:  50 mls/hr


   Documented by: 


Iopamidol (Iopamidol 755 Mg/Ml 75 Ml Bottle)  75 ml IVPUSH ONETIME ONE


   Stop: 11/06/21 10:07


   Last Admin: 11/06/21 10:32 Dose:  75 ml


   Documented by: 


Morphine Sulfate (Morphine 2 Mg/Ml Syringe)  1 mg IVPUSH ONETIME ONE


   Stop: 11/06/21 09:51


   Last Admin: 11/06/21 10:20 Dose:  1 mg


   Documented by: 


Potassium Chloride (Potassium Chloride 20 Meq Tab.Er)  20 meq PO ONETIME ONE


   Stop: 11/07/21 09:38


   Last Admin: 11/07/21 09:55 Dose:  20 meq


   Documented by: 


Potassium Chloride (Potassium Chloride 20 Meq Tab.Er)  20 meq PO ONETIME ONE


   Stop: 11/08/21 09:41


   Last Admin: 11/08/21 10:41 Dose:  Not Given


   Documented by: 


Potassium Chloride (Potassium Chloride 20 Meq Tab.Er)  20 meq PO ONETIME ONE


   Stop: 11/08/21 10:46


Potassium Chloride (Potassium Chloride 10 Meq Tab.Er)  20 meq PO ONETIME ONE


   Stop: 11/08/21 12:01


   Last Admin: 11/08/21 11:59 Dose:  20 meq


   Documented by: 


Potassium Chloride (Potassium Chloride 10 Meq Tab.Er)  20 meq PO ONETIME ONE


   Stop: 11/08/21 09:46


   Last Admin: 11/08/21 09:44 Dose:  20 meq


   Documented by: 


Potassium Chloride (Potassium Chloride 10 Meq Tab.Er)  20 meq PO ONETIME ONE


   Stop: 11/08/21 10:46


   Last Admin: 11/08/21 11:05 Dose:  20 meq


   Documented by: 











- Exam


Quality Assessment: Denies: Supplemental Oxygen


General: Reports: Alert, Oriented, Cooperative, No Acute Distress


HEENT: Reports: Pupils Equal, Mucous Membr. Moist/Pink


Neck: Reports: Supple, Trachea Midline


Lungs: Reports: Clear to Auscultation, Normal Respiratory Effort


Cardiovascular: Reports: Regular Rate, Regular Rhythm, Murmurs


GI/Abdominal Exam: Normal Bowel Sounds, Soft, Non-Tender, No Distention


 (Female) Exam: Deferred


Rectal (Female) Exam: Deferred


Back Exam: Reports: Normal Inspection, Full Range of Motion


Extremities: Normal Inspection, Normal Range of Motion, Non-Tender, Normal 

Capillary Refill, Pedal Edema (non-pitting)


Skin: Reports: Warm, Dry, Intact


Neurological: Reports: No New Focal Deficit


Psy/Mental Status: Reports: Alert, Normal Affect, Normal Mood

## 2022-01-07 ENCOUNTER — HOSPITAL ENCOUNTER (INPATIENT)
Dept: HOSPITAL 77 - KA.ED | Age: 87
LOS: 2 days | Discharge: HOME | DRG: 389 | End: 2022-01-09
Attending: FAMILY MEDICINE | Admitting: FAMILY MEDICINE
Payer: MEDICARE

## 2022-01-07 DIAGNOSIS — D72.828: ICD-10-CM

## 2022-01-07 DIAGNOSIS — Z79.899: ICD-10-CM

## 2022-01-07 DIAGNOSIS — M81.0: ICD-10-CM

## 2022-01-07 DIAGNOSIS — E87.1: ICD-10-CM

## 2022-01-07 DIAGNOSIS — I10: ICD-10-CM

## 2022-01-07 DIAGNOSIS — I07.1: ICD-10-CM

## 2022-01-07 DIAGNOSIS — Z79.82: ICD-10-CM

## 2022-01-07 DIAGNOSIS — Z79.890: ICD-10-CM

## 2022-01-07 DIAGNOSIS — E03.9: ICD-10-CM

## 2022-01-07 DIAGNOSIS — Z96.619: ICD-10-CM

## 2022-01-07 DIAGNOSIS — H35.30: ICD-10-CM

## 2022-01-07 DIAGNOSIS — E87.8: ICD-10-CM

## 2022-01-07 DIAGNOSIS — Z20.822: ICD-10-CM

## 2022-01-07 DIAGNOSIS — Z88.0: ICD-10-CM

## 2022-01-07 DIAGNOSIS — K21.9: ICD-10-CM

## 2022-01-07 DIAGNOSIS — K56.699: Primary | ICD-10-CM

## 2022-01-07 DIAGNOSIS — K44.9: ICD-10-CM

## 2022-01-07 DIAGNOSIS — Z88.5: ICD-10-CM

## 2022-01-07 DIAGNOSIS — E78.00: ICD-10-CM

## 2022-01-07 DIAGNOSIS — I35.0: ICD-10-CM

## 2022-01-07 DIAGNOSIS — E87.6: ICD-10-CM

## 2022-01-07 DIAGNOSIS — Z90.710: ICD-10-CM

## 2022-01-07 DIAGNOSIS — E78.5: ICD-10-CM

## 2022-01-07 DIAGNOSIS — Z88.8: ICD-10-CM

## 2022-01-07 DIAGNOSIS — Z96.659: ICD-10-CM

## 2022-01-07 DIAGNOSIS — M85.80: ICD-10-CM

## 2022-01-07 DIAGNOSIS — Z66: ICD-10-CM

## 2022-01-07 DIAGNOSIS — G25.81: ICD-10-CM

## 2022-01-07 LAB
ANION GAP SERPL CALC-SCNC: 13.8 MMOL/L (ref 5–15)
CHLORIDE SERPL-SCNC: 96 MMOL/L (ref 98–107)
SODIUM SERPL-SCNC: 134 MMOL/L (ref 136–145)

## 2022-01-07 PROCEDURE — U0002 COVID-19 LAB TEST NON-CDC: HCPCS

## 2022-01-07 NOTE — EDM.PDOC
ED HPI GENERAL MEDICAL PROBLEM





- General


Chief Complaint: Abdominal Pain


Stated Complaint: ABDOMINAL PAIN


Time Seen by Provider: 01/07/22 08:30


Source of Information: Reports: Patient, Family ()





- History of Present Illness


INITIAL COMMENTS - FREE TEXT/NARRATIVE: 





89-year-old female presents to the emergency room with complaints of abdominal 

pain distention and nausea.  She was recently admitted back in November 2021 for

a small bowel obstruction.  She has done very well since that time.  She does 

have a history of gastritis.  She is on chronic pain medication does take 

hydrocodone daily.  She denies any shortness of breath or upper respiratory 

symptoms.  No fever or chills.  No chest pain or diaphoresis.  She denies any 

current urinary symptoms.  The time of her last hospital admission she was 

placed in n.p.o. status and she improved with nonsurgical measures.  She states 

that her symptoms began early this morning.  She denies passing any significant 

flatus.  She is had a loose stool earlier this morning.  She is not noticed any 

blood in her stool and no hematuria.


Onset: Today


Duration: Hour(s):, Constant


Location: Reports: Abdomen


Quality: Reports: Ache


Severity: Moderate


Improves with: Reports: None


Worsens with: Reports: None


Associated Symptoms: Reports: Nausea/Vomiting.  Denies: Chest Pain, Cough, 

Diaphoresis, Fever/Chills, Shortness of Breath, Weakness


  ** Abdomen


Pain Score (Numeric/FACES): 8





- Related Data


                                    Allergies











Allergy/AdvReac Type Severity Reaction Status Date / Time


 


codeine Allergy  "passed Verified 01/07/22 08:32





   out"  


 


lisinopril Allergy  Cough Verified 01/07/22 08:32


 


Penicillins Allergy  Rash Verified 01/07/22 08:32











Home Meds: 


                                    Home Meds





Aspirin [Halfprin] 81 mg PO BRK 11/21/17 [History]


Denosumab [Prolia] 60 mg SUBCUT ASDIRECTED 11/21/17 [History]


Levothyroxine [Synthroid] 50 mcg PO ACBREAKFAST 11/21/17 [History]


Omega-3/DHA/Epa/Fish Oil [Omega 3 500 Softgel] 1 each PO BID 11/21/17 [History]


Potassium Chloride 10 meq PO DAILY 11/21/17 [History]


atorvaSTATin [Lipitor] 40 mg PO BEDTIME 11/21/17 [History]


rOPINIRole [Requip] 2 mg PO 1200,1800,2100 11/21/17 [History]


traZODone HCl [Trazodone HCl] 150 mg PO BEDTIME 11/21/17 [History]


Calcium Carbonate/Vitamin D3 [Calcium 500-Vit D3 200 Tablet] 2 each PO DAILY 

11/06/21 [History]


Carboxymethylcellulose Sodium [Artificial Tears] 1 ml EYEBOTH Q1H PRN 11/06/21 

[History]


Famotidine [Pepcid] 20 mg PO DAILY 11/06/21 [History]


Hydrocodone/Acetaminophen [HYDROcodone-Acetaminophen  MG] 1 tab PO DAILY 

11/06/21 [History]


Hydrocodone/Acetaminophen [HYDROcodone-Acetaminophen 5-325 MG] 1 tab PO BEDTIME 

11/06/21 [History]


Lutein/Min/Vit C/Vit E Acetate [Ocuvite Lutein] 1 cap PO BIDMEALS 11/06/21 

[History]


Pantoprazole [ProTONIX***] 40 mg PO QPM 11/06/21 [History]











Past Medical History


HEENT History: Reports: Macular Degeneration


Cardiovascular History: Reports: Heart Murmur, High Cholesterol, Hypertension


Gastrointestinal History: Reports: GERD, Hiatal Hernia


Endocrine/Metabolic History: Reports: Hypothyroidism, Osteoporosis





- Infectious Disease History


Infectious Disease History: Reports: Chicken Pox, Measles, Pertussis (Whooping 

Cough)





- Past Surgical History


Head Surgeries/Procedures: Reports: None


Female  Surgical History: Reports: Hysterectomy


Musculoskeletal Surgical History: Reports: Knee Replacement, Shoulder Surgery


Other Musculoskeletal Surgeries/Procedures:: Knee replacements 12/10 & 4/13, t

otal shoulder replacement 11/14





Social & Family History





- Family History


Family Medical History: No Pertinent Family History





- Tobacco Use


Tobacco Use Status *Q: Never Tobacco User





- Caffeine Use


Caffeine Use: Reports: Soda





- Recreational Drug Use


Recreational Drug Use: No





ED ROS GENERAL





- Review of Systems


Review Of Systems: See Below


Constitutional: Denies: Fever, Chills, Diaphoresis


HEENT: Reports: No Symptoms


Respiratory: Denies: Shortness of Breath, Wheezing, Cough


Cardiovascular: Reports: Blood Pressure Problem.  Denies: Chest Pain, 

Lightheadedness, Orthopnea, PND


Endocrine: Reports: No Symptoms


GI/Abdominal: Reports: Abdominal Pain, Distension, Nausea.  Denies: Black Stool,

 Bloody Stool, Flatus, Vomiting


: Denies: Hematuria, Pain


Musculoskeletal: Reports: No Symptoms


Skin: Reports: No Symptoms


Neurological: Reports: No Symptoms


Psychiatric: Reports: No Symptoms


Hematologic/Lymphatic: Reports: No Symptoms


Immunologic: Reports: No Symptoms





ED EXAM, GI/ABD





- Physical Exam


Exam: See Below


Exam Limited By: No Limitations


General Appearance: Alert, WD/WN, No Apparent Distress


Eyes: Bilateral: EOMI


Ears: Hearing Grossly Normal


Throat/Mouth: Normal Voice, No Airway Compromise


Head: Atraumatic, Normocephalic


Neck: Normal Inspection, Supple, Non-Tender


Respiratory/Chest: No Respiratory Distress, Lungs Clear, Normal Breath Sounds


Cardiovascular: Normal Peripheral Pulses, Regular Rate, Rhythm, Systolic Murmur,

 Gallop/S3


GI/Abdominal Exam: Soft, No Distention, Tender, Abnormal Bowel Sounds.  No: 

Guarding, Rigid, Rebound


Back Exam: Normal Inspection


Extremities: Normal Inspection


Neurological: Alert, Oriented, No Motor/Sensory Deficits


Psychiatric: Normal Affect, Normal Mood


Skin Exam: Warm, Dry, Intact, Normal Color, No Rash


Lymphatic: No Adenopathy





Course





- Vital Signs


Last Recorded V/S: 


                                Last Vital Signs











Temp  97.5 F   01/07/22 08:30


 


Pulse  81   01/07/22 10:30


 


Resp  18   01/07/22 08:30


 


BP  141/64 H  01/07/22 10:30


 


Pulse Ox  95   01/07/22 10:30














- Orders/Labs/Meds


Orders: 


                               Active Orders 24 hr











 Category Date Time Status


 


 CORONAVIRUS COVID-19 RAPID [[facility]LEC] Stat Lab  01/07/22 10:46 Ordered


 


 Sodium Chloride 0.9% [Normal Saline] 50 ml Med  01/07/22 10:00 Active





 IV ASDIRECTED   








                                Medication Orders





Sodium Chloride (Normal Saline)  50 mls @ 200 mls/min IV ASDIRECTED JALEN


   Last Admin: 01/07/22 10:10  Dose: 200 mls/min


   Documented by: SAQIB








Labs: 


                                Laboratory Tests











  01/07/22 01/07/22 01/07/22 Range/Units





  09:15 09:15 10:25 


 


WBC  16.00 H    (5.00-10.00)  10^3/uL


 


RBC  4.49    (3.80-5.50)  10^6/uL


 


Hgb  13.3    (12.0-16.0)  g/dL


 


Hct  40.5    (37.0-47.0)  %


 


MCV  90.2    (82.0-92.0)  fL


 


MCH  29.6    (27.0-31.0)  pg


 


MCHC  32.8    (32.0-36.0)  g/dL


 


RDW  13.4    (11.5-14.5)  %


 


Plt Count  212    (150-400)  10^3/uL


 


MPV  8.6    (7.4-10.4)  fL


 


Add Manual Diff  Yes    


 


Neutrophils % (Manual)  96 H    (50-70)  %


 


Band Neutrophils %  0 L    (4-12)  %


 


Lymphocytes % (Manual)  2 L    (20-40)  %


 


Atypical Lymphs %  0    


 


Monocytes % (Manual)  2    (2-8)  %


 


Eosinophils % (Manual)  0 L    (1-3)  %


 


Basophils % (Manual)  0    (0-1)  %


 


Toxic Granulation  1+ slight    


 


Sodium   134 L   (136-145)  mmol/L


 


Potassium   4.3   (3.5-5.1)  mmol/L


 


Chloride   96 L   ()  mmol/L


 


Carbon Dioxide   28.5   (21.0-32.0)  mmol/L


 


Anion Gap   13.8   (5-15)  mmol/L


 


BUN   26 H   (7-18)  mg/dL


 


Creatinine   0.84   (0.51-1.17)  mg/dL


 


Est Cr Clr Drug Dosing   32.61   mL/min


 


Estimated GFR (MDRD)   > 60   mL/min


 


Glucose   153 H   ()  mg/dL


 


Calcium   9.5  D   (8.7-10.3)  mg/dL


 


Total Bilirubin   0.6   (0.2-1.0)  mg/dL


 


AST   21   (15-37)  U/L


 


ALT   20   (14-63)  U/L


 


Alkaline Phosphatase   92   ()  U/L


 


Total Protein   7.0   (6.4-8.2)  g/dL


 


Albumin   3.74   (3.40-5.00)  g/dL


 


Specimen Type    Urinvoid  


 


Urine Color    Yellow  (YELLOW)  


 


Urine Appearance    Clear  (CLEAR)  


 


Urine pH    6.0  (5.0-9.0)  


 


Ur Specific Gravity    1.020  (1.005-1.030)  


 


Urine Protein    Negative  (NEGATIVE)  mg/dL


 


Urine Glucose (UA)    Negative  (NEGATIVE)  mg/dL


 


Urine Ketones    Trace H  (NEGATIVE)  mg/dL


 


Urine Occult Blood    Negative  (NEGATIVE)  


 


Urine Nitrite    Negative  (NEGATIVE)  


 


Urine Bilirubin    Negative  (NEGATIVE)  


 


Urine Urobilinogen    0.2  (0.2-1.0)  E.U./dL


 


Ur Leukocyte Esterase    Negative  (NEGATIVE)  


 


Urine RBC    0-5  (0-5)  /HPF


 


Urine WBC    0-5  (0-5)  /HPF


 


Ur Epithelial Cells    Occasional  /LPF


 


Urine Bacteria    Occasional  (NONE TO FEW)  /HPF











Meds: 


Medications











Generic Name Dose Route Start Last Admin





  Trade Name Freq  PRN Reason Stop Dose Admin


 


Sodium Chloride  50 mls @ 200 mls/min  01/07/22 10:00  01/07/22 10:10





  Normal Saline  IV   200 mls/min





  ASDIRECTED JALEN   Administration














Discontinued Medications














Generic Name Dose Route Start Last Admin





  Trade Name Freq  PRN Reason Stop Dose Admin


 


Sodium Chloride  1,000 mls @ 1,000 mls/hr  01/07/22 09:01  01/07/22 09:23





  Normal Saline  IV  01/07/22 10:00  1,000 mls/hr





  .BOLUS ONE   Administration


 


Iopamidol  75 ml  01/07/22 09:46  01/07/22 10:10





  Iopamidol 755 Mg/Ml 75 Ml Bottle  IVPUSH  01/07/22 09:47  75 ml





  ONETIME ONE   Administration


 


Meperidine HCl  25 mg  01/07/22 09:03  01/07/22 09:24





  Meperidine Pf 25 Mg/Ml Syringe  IM  01/07/22 09:04  25 mg





  ONETIME ONE   Administration


 


Ondansetron HCl  4 mg  01/07/22 09:04  01/07/22 09:24





  Ondansetron 4 Mg/2 Ml Sdv  IVPUSH  01/07/22 09:05  4 mg





  ONETIME ONE   Administration














- Radiology Interpretation


Free Text/Narrative:: 





Abdomen flat plate 1 view





Indication: Abdominal distention.  Small bowel obstruction





Discussion/impression:





Gaseous distention of numerous small bowel loops in the upper abdomen with air-

fluid levels.  Findings are nonspecific but can be seen with bowel obstruction. 

CT examination of the abdomen pelvis with intravenous and oral contrast is 

recommended.











CT the abdomen pelvis with IV contrast.





Findings:





Recurrent small bowel obstruction is seen.


There is a large hiatal hernia.


There is no free air or portal venous air.


There is no pneumostasis intestinalis


Jekel and degenerative changes lumbar spine fine are identified.  The liver


And spleen, adrenals, pancreas and aorta are unremarkable.


There is evidence of nephrolithiasis and occasional small renal cyst


The pelvis shows no mass or adenopathy uterus and ovaries have been removed





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


There is minimal free air in the pelvis.








Impression:





Recurrent small bowel obstruction





- Re-Assessments/Exams


Free Text/Narrative Re-Assessment/Exam: 





01/07/22 10:56


Patient reports improvement of her pain with IM Demerol.  She is rating her pain

a 4 out of 10.  IV fluids are running.  CT abdomen with IV contrast reveals 

recurrent small bowel obstruction.





Departure





- Departure


Time of Disposition: 11:00


Disposition: Admitted As Inpatient 66


Condition: Good


Clinical Impression: 


 Small bowel obstruction, Neutrophilic leukocytosis, Nausea








- Discharge Information


Referrals: 


Giselle Argueta MD [Primary Care Provider] - 


Forms:  ED Department Discharge





Sepsis Event Note (ED)





- Evaluation


Sepsis Screening Result: No Definite Risk





- Focused Exam


Vital Signs: 


                                   Vital Signs











  Temp Pulse Resp BP Pulse Ox


 


 01/07/22 10:30   81   141/64 H  95


 


 01/07/22 10:15   80   138/72  94 L


 


 01/07/22 10:00   88   146/73 H  96


 


 01/07/22 09:45   83   137/64  92 L


 


 01/07/22 09:30   80   154/67 H  96


 


 01/07/22 09:15   75   152/75 H  96


 


 01/07/22 09:00   75   143/75 H  93 L


 


 01/07/22 08:45   78   148/81 H  93 L


 


 01/07/22 08:30  97.5 F  79  18  145/79 H  95


 


 01/07/22 08:26  95.3 F L  81  18  178/91 H  94 L














- My Orders


Last 24 Hours: 


My Active Orders





01/07/22 10:00


Sodium Chloride 0.9% [Normal Saline] 50 ml IV ASDIRECTED 





01/07/22 10:46


CORONAVIRUS COVID-19 RAPID [MOLEC] Stat 














- Assessment/Plan


Last 24 Hours: 


My Active Orders





01/07/22 10:00


Sodium Chloride 0.9% [Normal Saline] 50 ml IV ASDIRECTED 





01/07/22 10:46


CORONAVIRUS COVID-19 RAPID [MOLEC] Stat 











Assessment:: 





1.  Recurrent small bowel obstruction


2.  Nausea


3.  Neutrophilic leukocytosis


Plan: 





Patient will be placed in inpatient admission under the care of Dr. Debora ortega. 

 Patient was be placed on n.p.o. status.  1 L normal saline fluids was given.  

This was discussed with Dr. Debora ortega and the patient and patient is in 

agreement with hospital admission.

## 2022-01-07 NOTE — CR
______________________________________________________________________________   

  

[id number] RAD/RAD Abdomen Flat Plate 1V  

EXAM:  RAD Abdomen Flat Plate 1V  

   

 INDICATION:  Abdominal distension.  Short bowel obstruction.  

   

 COMPARISON:  CT from November 6, 2021.  

   

 Discussion/Impression:  

   

 Gaseous distention of numerous small bowel loops in the upper abdomen with  

 air-fluid levels. Findings are nonspecific but can be seen with bowel  

 obstruction. CT examination of the abdomen/pelvis with intravenous and oral  

 contrast is recommended.  

   

 Electronically signed by Roberth Mariano MD on 1/7/2022 9:24 AM  

   

  

Roberth Mariano MD                 

 01/07/22 [id number]    

  

Thank you for allowing us to participate in the care of your patient.

## 2022-01-07 NOTE — CT
______________________________________________________________________________   

  

[id number] CT/CT Abdomen Pelvis W IV  

EXAM:   

   

 CT Abdomen Pelvis W IV  

   

 CLINICAL DATA:   

   

 ABDOMINAL PAIN  

   

 ELEVATED WHITE BLOOD CELL COUNT  

   

 HISTORY OF BOWEL OBSTRUCTION  

   

 COMPARISON:   

   

 CORRELATION IS MADE WITH THE CAT SCAN OF NOVEMBER 6, 2021  

   

 FINDINGS:   

   

 Recurrent small bowel obstruction is seen  

   

 There is a large hiatal hernia  

   

 There is no free air or portal venous air  

   

 There is no pneumatosis intestinalis.  

   

 Surgical and degenerative changes of the lumbar spine are identified  

   

 The liver and spleen, adrenals, pancreas, and aorta are unremarkable  

   

 There is evidence of nephrolithiasis and occasional small renal cysts.  

   

 The pelvis shows no mass or adenopathy  

   

 The uterus and ovaries have been removed.  

   

 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  

   

 There is minimal free fluid in the pelvis  

   

 IMPRESSION:  

   

 RECURRENT SMALL BOWEL OBSTRUCTION  

   

 Electronically signed by Jonatan Nichols MD on 1/7/2022 10:24 AM  

   

  

Jonatan Nichols MD                 

 01/07/22 [id number]    

  

Thank you for allowing us to participate in the care of your patient.

## 2022-01-07 NOTE — PCM.HP.2
H&P History of Present Illness





- General


Date of Service: 01/07/22


Admit Problem/Dx: 


                           Admission Diagnosis/Problem





Admission Diagnosis/Problem      Small bowel obstruction








Source of Information: Patient, Old Records, Provider (Roberth Malone PA-C (ED 

provider)), RN


History Limitations: Reports: No Limitations





- History of Present Illness


Initial Comments - Free Text/Narative: 


Mrs. Concepcion reports she was in her usual state of health yesterday without 

complaints. Ate chili for evening meal along with a glass of milk and went to 

bed without concerns, but then awoke in the night to have several loose stools 

and increasing abdominal pain. Due to the pain not resolving, presented to the 

[facility] ED for evaluation. 





In the ED, she was noted to have normal VS but with exam revealing minimal bowel

sounds and tenderness to palpation. Abdominal XR showed concern for possible 

SBO, which was confirmed on CT abdomen/pelvis, which showed no other acute 

concerns or evidence of high grade SBO or other complications. Labs revealed WBC

16 with neutrophilia, mildly decreased Na/Cl (134/96), mildly elevated  BUN 

(26), but otherwise normal CBC/CMP/UA/SARS-CoV-2. While in the ED, she received 

NS 1L meperidine 25mg IM, and ondansetron 4mg IV, with improvement in pain and 

nausea. She was accepted into inpatient status for further evaluation and 

management of SBO.





Upon arrival to the floor, she reports improved pain and resolution of nausea. 

Pain level currently 3/10. She endorses no other concerns and reports until last

night had been feeling well and taking medications without complication. 

Typically has at least one soft BM daily without any bowel regimen. Eats a 

varied diet.


  ** Abdomen


Pain Score (Numeric/FACES): 6





- Related Data


Allergies/Adverse Reactions: 


                                    Allergies











Allergy/AdvReac Type Severity Reaction Status Date / Time


 


codeine Allergy  "passed Verified 01/07/22 08:32





   out"  


 


lisinopril Allergy  Cough Verified 01/07/22 08:32


 


Penicillins Allergy  Rash Verified 01/07/22 08:32











Home Medications: 


                                    Home Meds





Aspirin [Halfprin] 81 mg PO BRK 11/21/17 [History]


Denosumab [Prolia] 60 mg SUBCUT ASDIRECTED 11/21/17 [History]


Levothyroxine [Synthroid] 50 mcg PO ACBREAKFAST 11/21/17 [History]


Omega-3/DHA/Epa/Fish Oil [Omega 3 500 Softgel] 1 each PO BIDMEALS 11/21/17 

[History]


Potassium Chloride 10 meq PO DAILY 11/21/17 [History]


atorvaSTATin [Lipitor] 40 mg PO BEDTIME 11/21/17 [History]


rOPINIRole [Requip] 2 mg PO 1200,1800,2100 11/21/17 [History]


traZODone HCl [Trazodone HCl] 150 mg PO BEDTIME 11/21/17 [History]


Calcium Carbonate/Vitamin D3 [Calcium 500-Vit D3 200 Tablet] 2 each PO 1200 

11/06/21 [History]


Carboxymethylcellulose Sodium [Artificial Tears] 1 ml EYEBOTH Q1H PRN 11/06/21 

[History]


Famotidine [Pepcid] 20 mg PO ACBREAKFAST 11/06/21 [History]


Hydrocodone/Acetaminophen [HYDROcodone-Acetaminophen  MG] 1 tab PO 

0800,1400 11/06/21 [History]


Hydrocodone/Acetaminophen [HYDROcodone-Acetaminophen 5-325 MG] 1 tab PO BEDTIME 

11/06/21 [History]


Lutein/Min/Vit C/Vit E Acetate [Ocuvite Lutein] 1 cap PO BIDMEALS 11/06/21 

[History]


Pantoprazole [ProTONIX***] 40 mg PO BEDTIME 11/06/21 [History]











Past Medical History


HEENT History: Reports: Macular Degeneration


Cardiovascular History: Reports: Heart Murmur, High Cholesterol, Hypertension


Gastrointestinal History: Reports: GERD, Hiatal Hernia


Endocrine/Metabolic History: Reports: Hypothyroidism, Osteoporosis





- Infectious Disease History


Infectious Disease History: Reports: Chicken Pox, Measles, Pertussis (Whooping 

Cough)





- Past Surgical History


Head Surgeries/Procedures: Reports: None


Female  Surgical History: Reports: Hysterectomy


Musculoskeletal Surgical History: Reports: Knee Replacement, Shoulder Surgery


Other Musculoskeletal Surgeries/Procedures:: Knee replacements 12/10 & 4/13, 

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





Social & Family History





- Family History


Family Medical History: No Pertinent Family History


HEENT: Reports: Macular Degeneration


Cardiac: Reports: CAD


GI: Denies: Bowel Obstruction


Neurological: Reports: CVA


Endocrine/Metabolic: Reports: Diabetes, type II


Oncologic: Reports: Breast





- Tobacco Use


Tobacco Use Status *Q: Never Tobacco User





- Caffeine Use


Caffeine Use: Reports: Soda





- Recreational Drug Use


Recreational Drug Use: No





H&P Review of Systems





- Review of Systems:


Review Of Systems: Comprehensive ROS is negative, except as noted in HPI.





Exam





- Exam


Exam: See Below





- Vital Signs


Vital Signs: 


                                Last Vital Signs











Temp  36.7 C   01/07/22 11:20


 


Pulse  94   01/07/22 11:20


 


Resp  18   01/07/22 11:20


 


BP  157/76 H  01/07/22 11:20


 


Pulse Ox  94 L  01/07/22 11:20











Weight: 54.613 kg





- Exam


Physical Exam Comments:: 


GENERAL: Well-appearing elderly white female appearing younger than stated age 

sitting in bedside chair in no acute distress.


HEENT: Normocephalic, atraumatic.  Conjunctiva clear.  Nares patent without 

discharge.  Mucous membranes moist, posterior pharynx unremarkable.


NECK: Supple, no masses.


CV: Regular rate and rhythm, 3/6 systolic loudest at base with radiation to 

bilateral carotids, no rubs or gallops.  2+ radial pulses.


PULMONARY: Normal effort, clear to auscultation bilaterally, no wheezes, rales, 

or rhonchi.


ABDOMEN: Positive bowel sounds in all 4 quadrants albeit soft in lower anita

drants, soft without rigidity or guarding, mild diffuse tenderness to palpation 

most in mid abdomen.


EXTREMITIES: No edema, cyanosis, or clubbing.


MUSCULOSKELETAL: Moves all extremities well.


NEUROLOGICAL: No obvious deficits.


DERMATOLOGIC: No rashes or suspicious lesions in exposed areas.


PSYCHIATRIC: Alert, interactive, appropriate affect.





- Patient Data


Lab Results Last 24 hrs: 


                         Laboratory Results - last 24 hr











  01/07/22 01/07/22 01/07/22 Range/Units





  09:15 09:15 10:25 


 


WBC  16.00 H    (5.00-10.00)  10^3/uL


 


RBC  4.49    (3.80-5.50)  10^6/uL


 


Hgb  13.3    (12.0-16.0)  g/dL


 


Hct  40.5    (37.0-47.0)  %


 


MCV  90.2    (82.0-92.0)  fL


 


MCH  29.6    (27.0-31.0)  pg


 


MCHC  32.8    (32.0-36.0)  g/dL


 


RDW  13.4    (11.5-14.5)  %


 


Plt Count  212    (150-400)  10^3/uL


 


MPV  8.6    (7.4-10.4)  fL


 


Add Manual Diff  Yes    


 


Neutrophils % (Manual)  96 H    (50-70)  %


 


Band Neutrophils %  0 L    (4-12)  %


 


Lymphocytes % (Manual)  2 L    (20-40)  %


 


Atypical Lymphs %  0    


 


Monocytes % (Manual)  2    (2-8)  %


 


Eosinophils % (Manual)  0 L    (1-3)  %


 


Basophils % (Manual)  0    (0-1)  %


 


Toxic Granulation  1+ slight    


 


Sodium   134 L   (136-145)  mmol/L


 


Potassium   4.3   (3.5-5.1)  mmol/L


 


Chloride   96 L   ()  mmol/L


 


Carbon Dioxide   28.5   (21.0-32.0)  mmol/L


 


Anion Gap   13.8   (5-15)  mmol/L


 


BUN   26 H   (7-18)  mg/dL


 


Creatinine   0.84   (0.51-1.17)  mg/dL


 


Est Cr Clr Drug Dosing   32.61   mL/min


 


Estimated GFR (MDRD)   > 60   mL/min


 


Glucose   153 H   ()  mg/dL


 


Calcium   9.5  D   (8.7-10.3)  mg/dL


 


Total Bilirubin   0.6   (0.2-1.0)  mg/dL


 


AST   21   (15-37)  U/L


 


ALT   20   (14-63)  U/L


 


Alkaline Phosphatase   92   ()  U/L


 


Total Protein   7.0   (6.4-8.2)  g/dL


 


Albumin   3.74   (3.40-5.00)  g/dL


 


Specimen Type    Urinvoid  


 


Urine Color    Yellow  (YELLOW)  


 


Urine Appearance    Clear  (CLEAR)  


 


Urine pH    6.0  (5.0-9.0)  


 


Ur Specific Gravity    1.020  (1.005-1.030)  


 


Urine Protein    Negative  (NEGATIVE)  mg/dL


 


Urine Glucose (UA)    Negative  (NEGATIVE)  mg/dL


 


Urine Ketones    Trace H  (NEGATIVE)  mg/dL


 


Urine Occult Blood    Negative  (NEGATIVE)  


 


Urine Nitrite    Negative  (NEGATIVE)  


 


Urine Bilirubin    Negative  (NEGATIVE)  


 


Urine Urobilinogen    0.2  (0.2-1.0)  E.U./dL


 


Ur Leukocyte Esterase    Negative  (NEGATIVE)  


 


Urine RBC    0-5  (0-5)  /HPF


 


Urine WBC    0-5  (0-5)  /HPF


 


Ur Epithelial Cells    Occasional  /LPF


 


Urine Bacteria    Occasional  (NONE TO FEW)  /HPF


 


SARS CoV-2 RNA Rapid COLLIN     (NEGATIVE)  














  01/07/22 Range/Units





  10:53 


 


WBC   (5.00-10.00)  10^3/uL


 


RBC   (3.80-5.50)  10^6/uL


 


Hgb   (12.0-16.0)  g/dL


 


Hct   (37.0-47.0)  %


 


MCV   (82.0-92.0)  fL


 


MCH   (27.0-31.0)  pg


 


MCHC   (32.0-36.0)  g/dL


 


RDW   (11.5-14.5)  %


 


Plt Count   (150-400)  10^3/uL


 


MPV   (7.4-10.4)  fL


 


Add Manual Diff   


 


Neutrophils % (Manual)   (50-70)  %


 


Band Neutrophils %   (4-12)  %


 


Lymphocytes % (Manual)   (20-40)  %


 


Atypical Lymphs %   


 


Monocytes % (Manual)   (2-8)  %


 


Eosinophils % (Manual)   (1-3)  %


 


Basophils % (Manual)   (0-1)  %


 


Toxic Granulation   


 


Sodium   (136-145)  mmol/L


 


Potassium   (3.5-5.1)  mmol/L


 


Chloride   ()  mmol/L


 


Carbon Dioxide   (21.0-32.0)  mmol/L


 


Anion Gap   (5-15)  mmol/L


 


BUN   (7-18)  mg/dL


 


Creatinine   (0.51-1.17)  mg/dL


 


Est Cr Clr Drug Dosing   mL/min


 


Estimated GFR (MDRD)   mL/min


 


Glucose   ()  mg/dL


 


Calcium   (8.7-10.3)  mg/dL


 


Total Bilirubin   (0.2-1.0)  mg/dL


 


AST   (15-37)  U/L


 


ALT   (14-63)  U/L


 


Alkaline Phosphatase   ()  U/L


 


Total Protein   (6.4-8.2)  g/dL


 


Albumin   (3.40-5.00)  g/dL


 


Specimen Type   


 


Urine Color   (YELLOW)  


 


Urine Appearance   (CLEAR)  


 


Urine pH   (5.0-9.0)  


 


Ur Specific Gravity   (1.005-1.030)  


 


Urine Protein   (NEGATIVE)  mg/dL


 


Urine Glucose (UA)   (NEGATIVE)  mg/dL


 


Urine Ketones   (NEGATIVE)  mg/dL


 


Urine Occult Blood   (NEGATIVE)  


 


Urine Nitrite   (NEGATIVE)  


 


Urine Bilirubin   (NEGATIVE)  


 


Urine Urobilinogen   (0.2-1.0)  E.U./dL


 


Ur Leukocyte Esterase   (NEGATIVE)  


 


Urine RBC   (0-5)  /HPF


 


Urine WBC   (0-5)  /HPF


 


Ur Epithelial Cells   /LPF


 


Urine Bacteria   (NONE TO FEW)  /HPF


 


SARS CoV-2 RNA Rapid COLLIN  Negative  (NEGATIVE)  











Result Diagrams: 


                                 01/07/22 09:15





                                 01/07/22 09:15





Sepsis Event Note





- Evaluation


Sepsis Screening Result: No Definite Risk





- Focused Exam


Vital Signs: 


                                   Vital Signs











  Temp Pulse Resp BP Pulse Ox


 


 01/07/22 11:20  36.7 C  94  18  157/76 H  94 L


 


 01/07/22 10:45   81   142/77 H  95


 


 01/07/22 10:30   81   141/64 H  95


 


 01/07/22 10:15   80   138/72  94 L


 


 01/07/22 10:00   88   146/73 H  96


 


 01/07/22 09:45   83   137/64  92 L


 


 01/07/22 09:30   80   154/67 H  96


 


 01/07/22 09:15   75   152/75 H  96


 


 01/07/22 09:00   75   143/75 H  93 L


 


 01/07/22 08:45   78   148/81 H  93 L


 


 01/07/22 08:30  36.4 C  79  18  145/79 H  95


 


 01/07/22 08:26  35.2 C L  81  18  178/91 H  94 L











Problem List Initiated/Reviewed/Updated: Yes


Orders Last 24hrs: 


                               Active Orders 24 hr











 Category Date Time Status


 


 Patient Status [ADT] Routine ADT  01/07/22 10:53 Active


 


 Antiembolic Devices [RC] PER UNIT ROUTINE Care  01/07/22 12:35 Ordered


 


 Intake and Output [RC] QSHIFT Care  01/07/22 12:35 Ordered


 


 Oxygen Therapy [RC] PRN Care  01/07/22 12:35 Ordered


 


 Up With Assistance [RC] ASDIRECTED Care  01/07/22 12:34 Ordered


 


 VTE/DVT Education [RC] PER UNIT ROUTINE Care  01/07/22 12:35 Ordered


 


 Vital Signs [RC] Q4H Care  01/07/22 12:35 Ordered


 


 NPO Now [Nothing per Oral Now Diet] [DIET] Diet  01/07/22 Lunch Active


 


 BASIC METABOLIC PANEL,BMP [CHEM] AM Lab  01/08/22 05:11 Ordered


 


 CBC WITH AUTO DIFF [HEME] AM Lab  01/08/22 05:11 Ordered


 


 Carboxymethylcellulose Sodium [Artificial Tears] Med  01/07/22 12:38 Ordered





 1 ml EYEBOTH Q1H PRN   


 


 Levothyroxine [Synthroid] Med  01/08/22 07:30 Ordered





 50 mcg PO ACBREAKFAST   


 


 Morphine Med  01/07/22 12:34 Ordered





 1 mg IVPUSH Q2H PRN   


 


 Ondansetron [Zofran] Med  01/07/22 12:34 Ordered





 4 mg IV Q4H PRN   


 


 Sodium Chloride 0.9% [Normal Saline] 1,000 ml Med  01/07/22 12:45 Ordered





 IV ASDIRECTED   


 


 Sodium Chloride 0.9% [Normal Saline] 50 ml Med  01/07/22 10:00 Active





 IV ASDIRECTED   


 


 rOPINIRole [Requip] Med  01/07/22 12:00 Ordered





 2 mg PO 1200,1800,2100   


 


 traZODone HCl Med  01/07/22 21:00 Ordered





 150 mg PO BEDTIME   


 


 Antiembolic Hose [OM.PC] Per Unit Routine Oth  01/07/22 12:35 Ordered


 


 Resuscitation Status Routine Resus Stat  01/07/22 12:34 Ordered








                                Medication Orders





Sodium Chloride (Normal Saline)  50 mls @ 200 mls/min IV ASDIRECTED [facility]


   Last Admin: 01/07/22 10:10  Dose: 200 mls/min


   Documented by: ENDECAY


Sodium Chloride (Normal Saline)  1,000 mls @ 100 mls/hr IV ASDIRECTED [facility]


Levothyroxine Sodium (Levothyroxine 50 Mcg Tab)  50 mcg PO ACBREAKFAST JALEN


Morphine Sulfate (Morphine 2 Mg/Ml Syringe)  1 mg IVPUSH Q2H PRN


   PRN Reason: Pain


Non-Formulary Medication (Carboxymethylcellulose Sodium [Artificial Tears])  1 

ml EYEBOTH Q1H PRN


   PRN Reason: dryeyes


Non-Formulary Medication (Trazodone Hcl)  150 mg PO BEDTIME JALEN


Ondansetron HCl (Ondansetron 4 Mg/2 Ml Sdv)  4 mg IV Q4H PRN


   PRN Reason: Nausea/Vomiting


Ropinirole HCl (Ropinirole 1 Mg Tab)  2 mg PO 1200,1800,2100 [facility]








Assessment/Plan Comment:: 


HPI summary: 


Mrs. Concepcion is an 89yoF with a history notable for recent SBO 11/2021 treated 

conservatively with resolution who reports she was in her usual state of health 

yesterday without complaints. Ate chili for evening meal along with a glass of 

milk and went to bed without concerns, but then awoke in the night to have 

several loose stools and increasing abdominal pain. Due to the pain not 

resolving, presented to the [facility] ED for evaluation. 





ED course: 


In the ED, she was noted to have normal VS but with exam revealing minimal bowel

sounds and tenderness to palpation. Abdominal XR showed concern for possible 

SBO, which was confirmed on CT abdomen/pelvis, which showed no other acute 

concerns or evidence of high grade SBO or other complications. Labs revealed WBC

16 with neutrophilia, mildly decreased Na/Cl (134/96), mildly elevated  BUN 

(26), but otherwise normal CBC/CMP/UA/SARS-CoV-2. While in the ED, she received 

NS 1L meperidine 25mg IM, and ondansetron 4mg IV, with improvement in pain and 

nausea. She was accepted into inpatient status for further evaluation and 

management of SBO.





Hospitalization problems and plan:


# Small bowel obstruction, recurrent


# Leukocytosis


# Hyponatremia/hypochloremia


# Elevated BUN


Reassuring status with bowel sounds auscultated on my exam upon arrival on the 

floor and generally reassuring clinical status. No obvious culprit for etiology 

to explain recurrent SBO, but prior resolved quite quickly without intervention,

so will await course do decide on future additional  workup or management. No 

evidence to suggest bacterial infection as culprit for leukocytosis at this 

time. Evidence of very mild dehydration on labs. 


   - NPO


   - NS @ 100cc/hr


   - Morphine 1mg q2h prn pain


   - Ondansetron 4mg q4h prn nausea


   - NGT if pain and/or nausea worsens or persistent despite above cares


   - CBC and BMP in AM





Chronic, stable conditions:


- Aortic stenosis / Tricuspid regurgitation: Last echo 10/4/21 when aortic valve

had mean gradient 23mmHg. Denies shortness of breath or other cardiopulmonary 

symptoms. Planned for repeat echo 10/2022. Managed by Dr. Collier, cardiology.


- Dysphagia / GERD: Stable lately. Hold pantoprazole 40mg daily, famotidine 20mg

HS, GI cocktail prn, Tums prn. 


- HLD: 5/5/21 lipid panel with LDL 82. Hold atorvastatin 40mg, ASA 81mg.


- Hypothyroidism: 11/10/21 TSH 3.21. Levothyroxine 50mcg daily with sip of 

water.


- Hypokalemia: Normal today. Hold KCl 10mEq daily. Will monitor and replace IV i

f needed.


- Osteopenia: Last Dexa Date 05/02/2017. Hold denosumab q6mos, Ca/D. 


- RLS: Controlled at baseline. Continue ropinirole 2mg TID with sip of water. 


- DDD/OA/spinal stenosis with chronic pain: Generally well controlled lately. 

S/p lumbar RFA 6/2019 without improvement. S/p recent ESIs without improvement. 

Hold Norco 10-325mg BID and 5-325mg HS prn. Give IV narcotic as ordered above. 


- Insomnia: Continue trazodone 150mg HS with sip of water.


- Macular degeneration: Progressive.


Misc. Medications: Hold MV, omega-3, artificial tears





Hospitalization details:


# FEN: NS @ 100cc/hr. Electrolytes as above. NPO.


# PPX: SCDs for DVT ppx; holding anticoagulation in case surgical indication 

arises. 


# Code status: DNR/DNI.


# Emergency contact: , Blake, whom patient will provide update.


# Disposition: Admit to inpatient status for further management of SBO. 

Anticipate 2-4 night stay followed by discharge to home if clinical course 

proceeds as expected.

## 2022-01-08 LAB
ANION GAP SERPL CALC-SCNC: 15.9 MMOL/L (ref 5–15)
CHLORIDE SERPL-SCNC: 103 MMOL/L (ref 98–107)
SODIUM SERPL-SCNC: 139 MMOL/L (ref 136–145)

## 2022-01-08 NOTE — PCM.PN
- General Info


Date of Service: 01/08/22


Subjective Update: 


Last evening, began passing flatus and has continued to pass flatus. Denies BM. 

Voiding without difficulty. Has been self ambulating in the halls without 

difficulty. Had a mild headache yesterday afternoon which resolved with 

acetaminophen. Did have recurrent RLS symptoms this morning around 0200, which 

have since improved since her AM ropinirole dose. No other concerns.





No nursing concerns.





- Patient Data


Vitals - Most Recent: 


                                Last Vital Signs











Temp  36.0 C L  01/08/22 06:22


 


Pulse  60   01/08/22 06:22


 


Resp  18   01/08/22 06:22


 


BP  131/57 L  01/08/22 06:22


 


Pulse Ox  93 L  01/08/22 06:22











Weight - Most Recent: 54.613 kg


I&O - Last 24 Hours: 


                                 Intake & Output











 01/07/22 01/08/22 01/08/22





 22:59 06:59 14:59


 


Intake Total 999 628 


 


Output Total 300 650 


 


Balance 699 -22 











Lab Results Last 24 Hours: 


                         Laboratory Results - last 24 hr











  01/07/22 01/07/22 01/08/22 Range/Units





  10:25 10:53 07:10 


 


WBC    6.28  (5.00-10.00)  10^3/uL


 


RBC    3.77 L  (3.80-5.50)  10^6/uL


 


Hgb    11.1 L D  (12.0-16.0)  g/dL


 


Hct    34.8 L  (37.0-47.0)  %


 


MCV    92.3 H  (82.0-92.0)  fL


 


MCH    29.4  (27.0-31.0)  pg


 


MCHC    31.9 L  (32.0-36.0)  g/dL


 


RDW    13.7  (11.5-14.5)  %


 


Plt Count    182  (150-400)  10^3/uL


 


MPV    8.7  (7.4-10.4)  fL


 


Immature Gran % (Auto)    0.2  (0.0-5.0)  %


 


Neut % (Auto)    80.4 H  (50.0-70.0)  %


 


Lymph % (Auto)    11.6 L  (20.0-40.0)  %


 


Mono % (Auto)    6.5  (2.0-8.0)  %


 


Eos % (Auto)    0.8 L  (1.0-3.0)  %


 


Baso % (Auto)    0.5  (0.0-1.0)  %


 


Neut # (Auto)    5.05  (2.50-7.00)  10^3/uL


 


Lymph # (Auto)    0.73 L  (1.00-4.00)  10^3/uL


 


Mono # (Auto)    0.41  (0.10-0.80)  10^3/uL


 


Eos # (Auto)    0.05 L  (0.10-0.30)  10^3/uL


 


Baso # (Auto)    0.03  (0.00-0.10)  10^3/uL


 


Immature Gran # (Auto)    0.01  (0.00-0.50)  10^3/uL


 


Sodium     (136-145)  mmol/L


 


Potassium     (3.5-5.1)  mmol/L


 


Chloride     ()  mmol/L


 


Carbon Dioxide     (21.0-32.0)  mmol/L


 


Anion Gap     (5-15)  mmol/L


 


BUN     (7-18)  mg/dL


 


Creatinine     (0.51-1.17)  mg/dL


 


Est Cr Clr Drug Dosing     mL/min


 


Estimated GFR (MDRD)     mL/min


 


Glucose     ()  mg/dL


 


Calcium     (8.7-10.3)  mg/dL


 


Specimen Type  Urinvoid    


 


Urine Color  Yellow    (YELLOW)  


 


Urine Appearance  Clear    (CLEAR)  


 


Urine pH  6.0    (5.0-9.0)  


 


Ur Specific Gravity  1.020    (1.005-1.030)  


 


Urine Protein  Negative    (NEGATIVE)  mg/dL


 


Urine Glucose (UA)  Negative    (NEGATIVE)  mg/dL


 


Urine Ketones  Trace H    (NEGATIVE)  mg/dL


 


Urine Occult Blood  Negative    (NEGATIVE)  


 


Urine Nitrite  Negative    (NEGATIVE)  


 


Urine Bilirubin  Negative    (NEGATIVE)  


 


Urine Urobilinogen  0.2    (0.2-1.0)  E.U./dL


 


Ur Leukocyte Esterase  Negative    (NEGATIVE)  


 


Urine RBC  0-5    (0-5)  /HPF


 


Urine WBC  0-5    (0-5)  /HPF


 


Ur Epithelial Cells  Occasional    /LPF


 


Urine Bacteria  Occasional    (NONE TO FEW)  /HPF


 


SARS CoV-2 RNA Rapid COLLIN   Negative   (NEGATIVE)  














  01/08/22 Range/Units





  07:10 


 


WBC   (5.00-10.00)  10^3/uL


 


RBC   (3.80-5.50)  10^6/uL


 


Hgb   (12.0-16.0)  g/dL


 


Hct   (37.0-47.0)  %


 


MCV   (82.0-92.0)  fL


 


MCH   (27.0-31.0)  pg


 


MCHC   (32.0-36.0)  g/dL


 


RDW   (11.5-14.5)  %


 


Plt Count   (150-400)  10^3/uL


 


MPV   (7.4-10.4)  fL


 


Immature Gran % (Auto)   (0.0-5.0)  %


 


Neut % (Auto)   (50.0-70.0)  %


 


Lymph % (Auto)   (20.0-40.0)  %


 


Mono % (Auto)   (2.0-8.0)  %


 


Eos % (Auto)   (1.0-3.0)  %


 


Baso % (Auto)   (0.0-1.0)  %


 


Neut # (Auto)   (2.50-7.00)  10^3/uL


 


Lymph # (Auto)   (1.00-4.00)  10^3/uL


 


Mono # (Auto)   (0.10-0.80)  10^3/uL


 


Eos # (Auto)   (0.10-0.30)  10^3/uL


 


Baso # (Auto)   (0.00-0.10)  10^3/uL


 


Immature Gran # (Auto)   (0.00-0.50)  10^3/uL


 


Sodium  139  (136-145)  mmol/L


 


Potassium  3.9  (3.5-5.1)  mmol/L


 


Chloride  103  ()  mmol/L


 


Carbon Dioxide  24.0  (21.0-32.0)  mmol/L


 


Anion Gap  15.9 H  (5-15)  mmol/L


 


BUN  14  (7-18)  mg/dL


 


Creatinine  0.68  (0.51-1.17)  mg/dL


 


Est Cr Clr Drug Dosing  40.29  mL/min


 


Estimated GFR (MDRD)  > 60  mL/min


 


Glucose  93  ()  mg/dL


 


Calcium  7.8 L D  (8.7-10.3)  mg/dL


 


Specimen Type   


 


Urine Color   (YELLOW)  


 


Urine Appearance   (CLEAR)  


 


Urine pH   (5.0-9.0)  


 


Ur Specific Gravity   (1.005-1.030)  


 


Urine Protein   (NEGATIVE)  mg/dL


 


Urine Glucose (UA)   (NEGATIVE)  mg/dL


 


Urine Ketones   (NEGATIVE)  mg/dL


 


Urine Occult Blood   (NEGATIVE)  


 


Urine Nitrite   (NEGATIVE)  


 


Urine Bilirubin   (NEGATIVE)  


 


Urine Urobilinogen   (0.2-1.0)  E.U./dL


 


Ur Leukocyte Esterase   (NEGATIVE)  


 


Urine RBC   (0-5)  /HPF


 


Urine WBC   (0-5)  /HPF


 


Ur Epithelial Cells   /LPF


 


Urine Bacteria   (NONE TO FEW)  /HPF


 


SARS CoV-2 RNA Rapid COLLIN   (NEGATIVE)  











Med Orders - Current: 


                               Current Medications





Acetaminophen (Acetaminophen 325 Mg Tab)  650 mg PO Q4H PRN


   PRN Reason: pain , fever


   Last Admin: 01/07/22 18:37 Dose:  650 mg


   Documented by: 


Artificial Tears (Carboxymethylcellulose Sodium 0.5% Ophth Soln 15 Ml Bottle)  1

ml EYEBOTH Q1H PRN


   PRN Reason: dryeyes


Sodium Chloride (Normal Saline)  50 mls @ 200 mls/min IV [facility]IRECTED [facility]


   Last Admin: 01/07/22 10:10 Dose:  200 mls/min


   Documented by: 


Sodium Chloride (Normal Saline)  1,000 mls @ 100 mls/hr IV ASDIRECTED [facility]


   Last Admin: 01/08/22 09:42 Dose:  100 mls/hr


   Documented by: 


Levothyroxine Sodium (Levothyroxine 50 Mcg Tab)  50 mcg PO ACBREAKFAST [facility]


   Last Admin: 01/08/22 06:31 Dose:  50 mcg


   Documented by: 


Morphine Sulfate (Morphine 2 Mg/Ml Syringe)  1 mg IVPUSH Q2H PRN


   PRN Reason: Pain


Ondansetron HCl (Ondansetron 4 Mg/2 Ml Sdv)  4 mg IV Q4H PRN


   PRN Reason: Nausea/Vomiting


Ropinirole HCl (Ropinirole 1 Mg Tab)  2 mg PO 1200,1800,2100 [facility]


   Last Admin: 01/07/22 20:49 Dose:  2 mg


   Documented by: 


Trazodone HCl (Trazodone 50 Mg Tab)  150 mg PO BEDTIME [facility]


   Last Admin: 01/07/22 20:49 Dose:  150 mg


   Documented by: 





Discontinued Medications





Sodium Chloride (Normal Saline)  1,000 mls @ 1,000 mls/hr IV .BOLUS ONE


   Stop: 01/07/22 10:00


   Last Admin: 01/07/22 09:23 Dose:  1,000 mls/hr


   Documented by: 


Iopamidol (Iopamidol 755 Mg/Ml 75 Ml Bottle)  75 ml IVPUSH ONETIME ONE


   Stop: 01/07/22 09:47


   Last Admin: 01/07/22 10:10 Dose:  75 ml


   Documented by: 


Meperidine HCl (Meperidine Pf 25 Mg/Ml Syringe)  25 mg IM ONETIME ONE


   Stop: 01/07/22 09:04


   Last Admin: 01/07/22 09:24 Dose:  25 mg


   Documented by: 


Ondansetron HCl (Ondansetron 4 Mg/2 Ml Sdv)  4 mg IVPUSH ONETIME ONE


   Stop: 01/07/22 09:05


   Last Admin: 01/07/22 09:24 Dose:  4 mg


   Documented by: 











- Exam


Physical Findings Comments:: 


GENERAL: Well-appearing elderly white female appearing younger than stated age 

sitting in bedside chair in no acute distress.


HEENT: Normocephalic, atraumatic.  Conjunctiva clear.  Nares patent without 

discharge.  Mucous membranes moist.


NECK: Supple, no masses.


CV: Regular rate and rhythm, 3/6 systolic loudest at base with radiation to 

bilateral carotids, no rubs or gallops.  2+ radial pulses.


PULMONARY: Normal effort, clear to auscultation bilaterally, no wheezes, rales, 

or rhonchi.


ABDOMEN: Positive bowel sounds in all 4 quadrants, nontender and soft without 

rigidity or guarding.


EXTREMITIES: No edema, cyanosis, or clubbing.


MUSCULOSKELETAL: Moves all extremities well.


NEUROLOGICAL: No obvious deficits.


DERMATOLOGIC: No rashes or suspicious lesions in exposed areas.


PSYCHIATRIC: Alert, interactive, appropriate affect, pleasant.





- Patient Data


Lab Results Last 24 hrs: 


                         Laboratory Results - last 24 hr











  01/07/22 01/07/22 01/08/22 Range/Units





  10:25 10:53 07:10 


 


WBC    6.28  (5.00-10.00)  10^3/uL


 


RBC    3.77 L  (3.80-5.50)  10^6/uL


 


Hgb    11.1 L D  (12.0-16.0)  g/dL


 


Hct    34.8 L  (37.0-47.0)  %


 


MCV    92.3 H  (82.0-92.0)  fL


 


MCH    29.4  (27.0-31.0)  pg


 


MCHC    31.9 L  (32.0-36.0)  g/dL


 


RDW    13.7  (11.5-14.5)  %


 


Plt Count    182  (150-400)  10^3/uL


 


MPV    8.7  (7.4-10.4)  fL


 


Immature Gran % (Auto)    0.2  (0.0-5.0)  %


 


Neut % (Auto)    80.4 H  (50.0-70.0)  %


 


Lymph % (Auto)    11.6 L  (20.0-40.0)  %


 


Mono % (Auto)    6.5  (2.0-8.0)  %


 


Eos % (Auto)    0.8 L  (1.0-3.0)  %


 


Baso % (Auto)    0.5  (0.0-1.0)  %


 


Neut # (Auto)    5.05  (2.50-7.00)  10^3/uL


 


Lymph # (Auto)    0.73 L  (1.00-4.00)  10^3/uL


 


Mono # (Auto)    0.41  (0.10-0.80)  10^3/uL


 


Eos # (Auto)    0.05 L  (0.10-0.30)  10^3/uL


 


Baso # (Auto)    0.03  (0.00-0.10)  10^3/uL


 


Immature Gran # (Auto)    0.01  (0.00-0.50)  10^3/uL


 


Sodium     (136-145)  mmol/L


 


Potassium     (3.5-5.1)  mmol/L


 


Chloride     ()  mmol/L


 


Carbon Dioxide     (21.0-32.0)  mmol/L


 


Anion Gap     (5-15)  mmol/L


 


BUN     (7-18)  mg/dL


 


Creatinine     (0.51-1.17)  mg/dL


 


Est Cr Clr Drug Dosing     mL/min


 


Estimated GFR (MDRD)     mL/min


 


Glucose     ()  mg/dL


 


Calcium     (8.7-10.3)  mg/dL


 


Specimen Type  Urinvoid    


 


Urine Color  Yellow    (YELLOW)  


 


Urine Appearance  Clear    (CLEAR)  


 


Urine pH  6.0    (5.0-9.0)  


 


Ur Specific Gravity  1.020    (1.005-1.030)  


 


Urine Protein  Negative    (NEGATIVE)  mg/dL


 


Urine Glucose (UA)  Negative    (NEGATIVE)  mg/dL


 


Urine Ketones  Trace H    (NEGATIVE)  mg/dL


 


Urine Occult Blood  Negative    (NEGATIVE)  


 


Urine Nitrite  Negative    (NEGATIVE)  


 


Urine Bilirubin  Negative    (NEGATIVE)  


 


Urine Urobilinogen  0.2    (0.2-1.0)  E.U./dL


 


Ur Leukocyte Esterase  Negative    (NEGATIVE)  


 


Urine RBC  0-5    (0-5)  /HPF


 


Urine WBC  0-5    (0-5)  /HPF


 


Ur Epithelial Cells  Occasional    /LPF


 


Urine Bacteria  Occasional    (NONE TO FEW)  /HPF


 


SARS CoV-2 RNA Rapid COLLIN   Negative   (NEGATIVE)  














  01/08/22 Range/Units





  07:10 


 


WBC   (5.00-10.00)  10^3/uL


 


RBC   (3.80-5.50)  10^6/uL


 


Hgb   (12.0-16.0)  g/dL


 


Hct   (37.0-47.0)  %


 


MCV   (82.0-92.0)  fL


 


MCH   (27.0-31.0)  pg


 


MCHC   (32.0-36.0)  g/dL


 


RDW   (11.5-14.5)  %


 


Plt Count   (150-400)  10^3/uL


 


MPV   (7.4-10.4)  fL


 


Immature Gran % (Auto)   (0.0-5.0)  %


 


Neut % (Auto)   (50.0-70.0)  %


 


Lymph % (Auto)   (20.0-40.0)  %


 


Mono % (Auto)   (2.0-8.0)  %


 


Eos % (Auto)   (1.0-3.0)  %


 


Baso % (Auto)   (0.0-1.0)  %


 


Neut # (Auto)   (2.50-7.00)  10^3/uL


 


Lymph # (Auto)   (1.00-4.00)  10^3/uL


 


Mono # (Auto)   (0.10-0.80)  10^3/uL


 


Eos # (Auto)   (0.10-0.30)  10^3/uL


 


Baso # (Auto)   (0.00-0.10)  10^3/uL


 


Immature Gran # (Auto)   (0.00-0.50)  10^3/uL


 


Sodium  139  (136-145)  mmol/L


 


Potassium  3.9  (3.5-5.1)  mmol/L


 


Chloride  103  ()  mmol/L


 


Carbon Dioxide  24.0  (21.0-32.0)  mmol/L


 


Anion Gap  15.9 H  (5-15)  mmol/L


 


BUN  14  (7-18)  mg/dL


 


Creatinine  0.68  (0.51-1.17)  mg/dL


 


Est Cr Clr Drug Dosing  40.29  mL/min


 


Estimated GFR (MDRD)  > 60  mL/min


 


Glucose  93  ()  mg/dL


 


Calcium  7.8 L D  (8.7-10.3)  mg/dL


 


Specimen Type   


 


Urine Color   (YELLOW)  


 


Urine Appearance   (CLEAR)  


 


Urine pH   (5.0-9.0)  


 


Ur Specific Gravity   (1.005-1.030)  


 


Urine Protein   (NEGATIVE)  mg/dL


 


Urine Glucose (UA)   (NEGATIVE)  mg/dL


 


Urine Ketones   (NEGATIVE)  mg/dL


 


Urine Occult Blood   (NEGATIVE)  


 


Urine Nitrite   (NEGATIVE)  


 


Urine Bilirubin   (NEGATIVE)  


 


Urine Urobilinogen   (0.2-1.0)  E.U./dL


 


Ur Leukocyte Esterase   (NEGATIVE)  


 


Urine RBC   (0-5)  /HPF


 


Urine WBC   (0-5)  /HPF


 


Ur Epithelial Cells   /LPF


 


Urine Bacteria   (NONE TO FEW)  /HPF


 


SARS CoV-2 RNA Rapid COLLIN   (NEGATIVE)  











Result Diagrams: 


                                 01/08/22 07:10





                                 01/08/22 07:10





Sepsis Event Note





- Evaluation


Sepsis Screening Result: No Definite Risk





- Focused Exam


Vital Signs: 


                                   Vital Signs











  Temp Pulse Resp BP Pulse Ox


 


 01/08/22 06:22  36.0 C L  60  18  131/57 L  93 L


 


 01/08/22 03:00  36.3 C  83  16  129/49 L  96


 


 01/07/22 23:00  36.2 C  76  16  122/47 L  93 L














- Problem List Review


Problem List Initiated/Reviewed/Updated: Yes





- My Orders


Last 24 Hours: 


My Active Orders





01/07/22 12:00


rOPINIRole [Requip]   2 mg PO 1200,1800,2100 





01/07/22 12:34


Up With Assistance [RC] ASDIRECTED 


Morphine   1 mg IVPUSH Q2H PRN 


Ondansetron [Zofran]   4 mg IV Q4H PRN 


Resuscitation Status Routine 





01/07/22 12:35


Antiembolic Devices [RC] .PRN 


Intake and Output [RC] 1400,2200,0600 


Oxygen Therapy [RC] .PRN 


VTE/DVT Education [RC] 14,22 


Vital Signs [RC] 03,07,11,15,19,23 


Antiembolic Hose [OM.PC] Per Unit Routine 





01/07/22 12:45


Sodium Chloride 0.9% [Normal Saline] 1,000 ml IV ASDIRECTED 





01/07/22 12:51


Carboxymethylcellulose Sodium [Refresh Tears 0.5%]   1 ml EYEBOTH Q1H PRN 





01/07/22 18:15


Acetaminophen [TylenoL]   650 mg PO Q4H PRN 





01/07/22 21:00


traZODone   150 mg PO BEDTIME 





01/08/22 07:30


Levothyroxine [Synthroid]   50 mcg PO ACBREAKFAST 





01/09/22 05:11


BASIC METABOLIC PANEL,BMP [CHEM] AM 


CBC WITH AUTO DIFF [HEME] AM 














- Plan


Plan:: 


HPI summary: 


Mrs. Concepcion is an 89yoF with a history notable for recent SBO 11/2021 treated 

conservatively with resolution who reports she was in her usual state of health 

on the day prior to admission without complaints. Ate chili for evening meal 

along with a glass of milk and went to bed without concerns, but then awoke in 

the night to have several loose stools and increasing abdominal pain. Due to the

 pain not resolving, presented to the [facility] ED for evaluation. 





ED course: 


In the ED, she was noted to have normal VS but with exam revealing minimal bowel

 sounds and tenderness to palpation. Abdominal XR showed concern for possible 

SBO, which was confirmed on CT abdomen/pelvis, which showed no other acute 

concerns or evidence of high grade SBO or other complications. Labs revealed WBC

 16 with neutrophilia, mildly decreased Na/Cl (134/96), mildly elevated  BUN 

(26), but otherwise normal CBC/CMP/UA/SARS-CoV-2. While in the ED, she received 

NS 1L meperidine 25mg IM, and ondansetron 4mg IV, with improvement in pain and 

nausea. She was accepted into inpatient status for further evaluation and ma

nagement of SBO.





Hospital course:


1/7/21: Reassuring status with bowel sounds auscultated on admission exam and 

generally reassuring clinical status. Etiology of SBO favors adhesive disease 

given lack of other risk factors. No evidence to suggest bacterial infection as 

culprit for leukocytosis at this time. Evidence of very mild dehydration on 

labs. NPO status and IVF started with prn morphine and ondansetron. 





1/8/21: Passing flatus, active BS, and no abdominal pain or nausea without 

having received any pain or nausea medications since admission. Normalized WBC, 

Na/Cl, and BUN. 





Hospitalization problems and plan:


# Small bowel obstruction, recurrent


# Leukocytosis, resolved


# Hyponatremia/hypochloremia, resolved


# Elevated BUN, resolved


   - Advance diet to ice chips and sips of water, with plan to progress to clear

 liquids later today if ongoing reassuring clinical status, especially if has BM


   - NS @ 100cc/hr


   - Morphine 1mg q2h prn pain


   - Ondansetron 4mg q4h prn nausea


   - CBC and BMP in AM





Chronic, stable conditions:


- Aortic stenosis / Tricuspid regurgitation: Last echo 10/4/21 when aortic valve

 had mean gradient 23mmHg. Denies shortness of breath or other cardiopulmonary 

symptoms. Planned for repeat echo 10/2022. Managed by Dr. Collier, cardiology.


- Dysphagia / GERD: Stable lately. Hold pantoprazole 40mg daily, famotidine 20mg

 HS, GI cocktail prn, Tums prn. 


- HLD: 5/5/21 lipid panel with LDL 82. Hold atorvastatin 40mg, ASA 81mg.


- Hypothyroidism: 11/10/21 TSH 3.21. Levothyroxine 50mcg daily with sip of 

water.


- Hypokalemia: Normal today. Hold KCl 10mEq daily. Will monitor and replace IV 

if needed.


- Osteopenia: Last Dexa Date 05/02/2017. Hold denosumab q6mos, Ca/D. 


- RLS: Controlled at baseline. Continue ropinirole 2mg TID with sip of water. 


- DDD/OA/spinal stenosis with chronic pain: Generally well controlled lately. 

S/p lumbar RFA 6/2019 without improvement. S/p recent ESIs without improvement. 

Hold Norco 10-325mg BID and 5-325mg HS prn. Give IV narcotic as ordered above. 


- Insomnia: Continue trazodone 150mg HS with sip of water.


- Macular degeneration: Progressive.


Misc. Medications: Hold MV, omega-3, artificial tears.





Hospitalization details:


# FEN: NS @ 100cc/hr. Electrolytes as above. Diet as above.


# PPX: SCDs for DVT ppx; holding anticoagulation in case surgical indication 

arises. 


# Code status: DNR/DNI.


# Emergency contact: , Blake, whom patient will provide update.


# Disposition: Continue inpatient status for further management of SBO. 

Anticipate discharge to home once SBO resolved.

## 2022-01-09 LAB
ANION GAP SERPL CALC-SCNC: 14.2 MMOL/L (ref 5–15)
CHLORIDE SERPL-SCNC: 106 MMOL/L (ref 98–107)
SODIUM SERPL-SCNC: 140 MMOL/L (ref 136–145)

## 2022-01-09 NOTE — [id number]
**Discharge Summary





- Hospital Course


Free Text/Narrative:: 


Date of admission:


1/7/22





Date of discharge: 


1/9/22





Admission diagnoses:


# Small bowel obstruction, recurrent


# Leukocytosis


# Hyponatremia/hypochloremia


# Elevated BUN





Discharge diagnoses:


# Small bowel obstruction, resolved


# Leukocytosis, resolved


# Hyponatremia/hypochloremia, resolved


# Elevated BUN, resolved





Consultations:


None





Procedures:


None





Hospital course:


Mrs. Concepcion is an 89yoF with a history notable for recent SBO 11/2021 treated 

conservatively with resolution who reports she was in her usual state of health 

on the day prior to admission without complaints. Ate chili for evening meal 

along with a glass of milk and went to bed without concerns, but then awoke in 

the night to have several loose stools and increasing abdominal pain. Due to the

pain not resolving, presented to the [facility] ED for evaluation. 





In the ED, she was noted to have normal VS but with exam revealing minimal bowel

sounds and tenderness to palpation. Abdominal XR showed concern for possible 

SBO, which was confirmed on CT abdomen/pelvis, which showed no other acute 

concerns or evidence of high grade SBO or other complications. Labs revealed WBC

16 with neutrophilia, mildly decreased Na/Cl (134/96), mildly elevated  BUN 

(26), but otherwise normal CBC/CMP/UA/SARS-CoV-2. While in the ED, she received 

NS 1L meperidine 25mg IM, and ondansetron 4mg IV, with improvement in pain and 

nausea. She was accepted into inpatient status for further evaluation and 

management of SBO.





She had reassuring status with bowel sounds auscultated on admission exam and 

generally reassuring clinical status. Etiology of SBO favored adhesive disease 

given lack of other risk factors. NPO status and IVF started with prn morphine 

and ondansetron. She progressed well with return of flatus and bowel sounds 

without any need for morphine or ondansetron. Diet was advanced and she was 

tolerating a GI soft diet at discharge. Leukocytosis and labs suggestive of mild

dehydration all normalized. Counseled regarding close monitoring of bowel 

movements and diet. 





Incidentally, she mentioned L calf firmness and very mild tenderness which she 

has had over the course of the last month, but has seemed to be worsening in the

past week to 10 days. Typically wears compression stockings during the day. 





Discharge and follow-up recommendations:


- Discharge to home


- Medication changes at discharge: None


- Venous ultrasound of LLE on 1/10/22 or 1/11/22; patient will call to schedule


- Follow-up with Dr. Argueta the week of 1/17/22; patient will call to 

schedule


Diagnosis: Stroke: No





- Discharge Data


Discharge Date: 01/09/22


Discharge Disposition: Home, Self-Care 01


Condition: Good





- Referral to Home Health


Primary Care Physician: 


Giselle Argueta MD








- Patient Instructions


Diet: GI Soft/Low Residue/Low Fiber


Activity: As Tolerated


Notify Provider of: Fever, Increased Pain, Nausea and/or Vomiting





- Discharge Plan


*PRESCRIPTION DRUG MONITORING PROGRAM REVIEWED*: Not Applicable


*COPY OF PRESCRIPTION DRUG MONITORING REPORT IN PATIENT [facility]ONATAN: Not Applicable


Home Medications: 


                                    Home Meds





Aspirin [Halfprin] 81 mg PO BRK 11/21/17 [History]


Denosumab [Prolia] 60 mg SUBCUT ASDIRECTED 11/21/17 [History]


Levothyroxine [Synthroid] 50 mcg PO ACBREAKFAST 11/21/17 [History]


Omega-3/DHA/Epa/Fish Oil [Omega 3 500 Softgel] 1 each PO BIDMEALS 11/21/17 

[History]


Potassium Chloride 10 meq PO DAILY 11/21/17 [History]


atorvaSTATin [Lipitor] 40 mg PO BEDTIME 11/21/17 [History]


rOPINIRole [Requip] 2 mg PO 1200,1800,2100 11/21/17 [History]


traZODone HCl [Trazodone HCl] 150 mg PO BEDTIME 11/21/17 [History]


Calcium Carbonate/Vitamin D3 [Calcium 500-Vit D3 200 Tablet] 2 each PO 1200 

11/06/21 [History]


Carboxymethylcellulose Sodium [Artificial Tears] 1 ml EYEBOTH Q1H PRN 11/06/21 

[History]


Famotidine [Pepcid] 20 mg PO ACBREAKFAST 11/06/21 [History]


Hydrocodone/Acetaminophen [HYDROcodone-Acetaminophen  MG] 1 tab PO 

0800,1400 11/06/21 [History]


Hydrocodone/Acetaminophen [HYDROcodone-Acetaminophen 5-325 MG] 1 tab PO BEDTIME 

11/06/21 [History]


Lutein/Min/Vit C/Vit E Acetate [Ocuvite Lutein] 1 cap PO BIDMEALS 11/06/21 

[History]


Pantoprazole [ProTONIX***] 40 mg PO BEDTIME 11/06/21 [History]


Acetaminophen [Tylenol] 650 mg PO Q6H PRN  tablet 01/09/22 [Rx]








Patient Handouts:  Bowel Obstruction, Easy-to-Read, Bowel Obstruction


Referrals: 


Giselle Argueat MD [Primary Care Provider] -  (Patient will call for 

appointment to be scheduled the week of 1/17/22 in addition to left leg 

ultrasound on 1/10/22 or 1/11/22.)





- Discharge Summary/Plan Comment


DC Time >30 min.: Yes


Total # of Minutes for Discharge Time: 


35





- General Info


Date of Service: 01/09/22


Subjective Update: 


Yesterday afternoon, had small bowel movement followed by several large bowel 

movements later in the night. Has continued to tolerate fluid and diet without 

nausea or return of pain. No other concerns.





No nursing concerns.





- Patient Data


Vitals - Most Recent: 


                                Last Vital Signs











Temp  36.7 C   01/09/22 04:59


 


Pulse  78   01/09/22 11:00


 


Resp  16   01/09/22 11:00


 


BP  147/78 H  01/09/22 11:00


 


Pulse Ox  97   01/09/22 11:00











Weight - Most Recent: 54.613 kg


I&O - Last 24 hours: 


                                 Intake & Output











 01/08/22 01/09/22 01/09/22





 22:59 06:59 14:59


 


Intake Total 804 120 


 


Output Total 1025 500 


 


Balance -221 -380 











Lab Results - Last 24 hrs: 


                         Laboratory Results - last 24 hr











  01/09/22 01/09/22 Range/Units





  07:10 07:10 


 


WBC  6.11   (5.00-10.00)  10^3/uL


 


RBC  3.80   (3.80-5.50)  10^6/uL


 


Hgb  11.3 L   (12.0-16.0)  g/dL


 


Hct  34.2 L   (37.0-47.0)  %


 


MCV  90.0   (82.0-92.0)  fL


 


MCH  29.7   (27.0-31.0)  pg


 


MCHC  33.0   (32.0-36.0)  g/dL


 


RDW  13.5   (11.5-14.5)  %


 


Plt Count  191   (150-400)  10^3/uL


 


MPV  8.6   (7.4-10.4)  fL


 


Immature Gran % (Auto)  0.2   (0.0-5.0)  %


 


Neut % (Auto)  73.2 H   (50.0-70.0)  %


 


Lymph % (Auto)  16.9 L   (20.0-40.0)  %


 


Mono % (Auto)  7.7   (2.0-8.0)  %


 


Eos % (Auto)  1.5   (1.0-3.0)  %


 


Baso % (Auto)  0.5   (0.0-1.0)  %


 


Neut # (Auto)  4.48   (2.50-7.00)  10^3/uL


 


Lymph # (Auto)  1.03   (1.00-4.00)  10^3/uL


 


Mono # (Auto)  0.47   (0.10-0.80)  10^3/uL


 


Eos # (Auto)  0.09 L   (0.10-0.30)  10^3/uL


 


Baso # (Auto)  0.03   (0.00-0.10)  10^3/uL


 


Immature Gran # (Auto)  0.01   (0.00-0.50)  10^3/uL


 


Sodium   140  (136-145)  mmol/L


 


Potassium   3.7  (3.5-5.1)  mmol/L


 


Chloride   106  ()  mmol/L


 


Carbon Dioxide   23.5  (21.0-32.0)  mmol/L


 


Anion Gap   14.2  (5-15)  mmol/L


 


BUN   10  (7-18)  mg/dL


 


Creatinine   0.60  (0.51-1.17)  mg/dL


 


Est Cr Clr Drug Dosing   45.66  mL/min


 


Estimated GFR (MDRD)   > 60  mL/min


 


Glucose   95  ()  mg/dL


 


Calcium   7.9 L  (8.7-10.3)  mg/dL











Med Orders - Current: 


                               Current Medications





Acetaminophen (Acetaminophen 325 Mg Tab)  650 mg PO Q4H PRN


   PRN Reason: pain , fever


   Last Admin: 01/09/22 04:58 Dose:  650 mg


   Documented by: 


Artificial Tears (Carboxymethylcellulose Sodium 0.5% Ophth Soln 15 Ml Bottle)  1

ml EYEBOTH Q1H PRN


   PRN Reason: dryeyes


Sodium Chloride (Normal Saline)  50 mls @ 200 mls/min IV ASDIRECTED JALEN


   Last Admin: 01/07/22 10:10 Dose:  200 mls/min


   Documented by: 


Levothyroxine Sodium (Levothyroxine 50 Mcg Tab)  50 mcg PO ACBREAKFAST [facility]


   Last Admin: 01/09/22 06:33 Dose:  Not Given


   Documented by: 


Morphine Sulfate (Morphine 2 Mg/Ml Syringe)  1 mg IVPUSH Q2H PRN


   PRN Reason: Pain


Ondansetron HCl (Ondansetron 4 Mg/2 Ml Sdv)  4 mg IV Q4H PRN


   PRN Reason: Nausea/Vomiting


Ropinirole HCl (Ropinirole 1 Mg Tab)  2 mg PO 1200,1800,2100 [facility]


   Last Admin: 01/09/22 11:26 Dose:  2 mg


   Documented by: 


Trazodone HCl (Trazodone 50 Mg Tab)  150 mg PO BEDTIME [facility]


   Last Admin: 01/08/22 20:08 Dose:  150 mg


   Documented by: 





Discontinued Medications





Sodium Chloride (Normal Saline)  1,000 mls @ 1,000 mls/hr IV .BOLUS ONE


   Stop: 01/07/22 10:00


   Last Admin: 01/07/22 09:23 Dose:  1,000 mls/hr


   Documented by: 


Sodium Chloride (Normal Saline)  1,000 mls @ 100 mls/hr IV ASDIRECTED [facility]


   Last Admin: 01/08/22 09:42 Dose:  100 mls/hr


   Documented by: 


Iopamidol (Iopamidol 755 Mg/Ml 75 Ml Bottle)  75 ml IVPUSH ONETIME ONE


   Stop: 01/07/22 09:47


   Last Admin: 01/07/22 10:10 Dose:  75 ml


   Documented by: 


Meperidine HCl (Meperidine Pf 25 Mg/Ml Syringe)  25 mg IM ONETIME ONE


   Stop: 01/07/22 09:04


   Last Admin: 01/07/22 09:24 Dose:  25 mg


   Documented by: 


Ondansetron HCl (Ondansetron 4 Mg/2 Ml Sdv)  4 mg IVPUSH ONETIME ONE


   Stop: 01/07/22 09:05


   Last Admin: 01/07/22 09:24 Dose:  4 mg


   Documented by: 











- Exam


Physical Findings Comments:: 


GENERAL: Well-appearing elderly white female appearing younger than stated age 

sitting in bedside chair in no acute distress.


HEENT: Normocephalic, atraumatic.  Conjunctiva clear.  Nares patent without 

discharge.  Mucous membranes moist.


NECK: Supple, no masses.


CV: Regular rate and rhythm, 3/6 systolic loudest at base with radiation to 

bilateral carotids, no rubs or gallops.  2+ radial pulses.


PULMONARY: Normal effort, clear to auscultation bilaterally, no wheezes, rales, 

or rhonchi.


ABDOMEN: Positive bowel sounds in all 4 quadrants, nontender and soft without 

rigidity or guarding.


EXTREMITIES: Trace edema and mild firmness of distal L leg. Calf circumference 

1cm larger on L than R. No overlying redness, warmth, or wound.


MUSCULOSKELETAL: Moves all extremities well.


NEUROLOGICAL: No obvious deficits.


DERMATOLOGIC: No rashes or suspicious lesions in exposed areas.


PSYCHIATRIC: Alert, interactive, appropriate affect, pleasant.

## 2022-01-28 ENCOUNTER — HOSPITAL ENCOUNTER (OUTPATIENT)
Dept: HOSPITAL 77 - KA.ED | Age: 87
Setting detail: OBSERVATION
LOS: 2 days | Discharge: HOME | End: 2022-01-30
Attending: NURSE PRACTITIONER | Admitting: NURSE PRACTITIONER
Payer: MEDICARE

## 2022-01-28 DIAGNOSIS — Z79.899: ICD-10-CM

## 2022-01-28 DIAGNOSIS — D64.9: ICD-10-CM

## 2022-01-28 DIAGNOSIS — Z88.5: ICD-10-CM

## 2022-01-28 DIAGNOSIS — I35.0: ICD-10-CM

## 2022-01-28 DIAGNOSIS — G25.81: ICD-10-CM

## 2022-01-28 DIAGNOSIS — Z98.890: ICD-10-CM

## 2022-01-28 DIAGNOSIS — E03.9: ICD-10-CM

## 2022-01-28 DIAGNOSIS — E78.5: ICD-10-CM

## 2022-01-28 DIAGNOSIS — D72.829: ICD-10-CM

## 2022-01-28 DIAGNOSIS — Z88.0: ICD-10-CM

## 2022-01-28 DIAGNOSIS — K56.609: Primary | ICD-10-CM

## 2022-01-28 DIAGNOSIS — K21.9: ICD-10-CM

## 2022-01-28 DIAGNOSIS — E83.51: ICD-10-CM

## 2022-01-28 DIAGNOSIS — E87.6: ICD-10-CM

## 2022-01-28 DIAGNOSIS — M81.0: ICD-10-CM

## 2022-01-28 DIAGNOSIS — I10: ICD-10-CM

## 2022-01-28 DIAGNOSIS — Z79.82: ICD-10-CM

## 2022-01-28 DIAGNOSIS — Z79.890: ICD-10-CM

## 2022-01-28 DIAGNOSIS — M48.00: ICD-10-CM

## 2022-01-28 DIAGNOSIS — G47.00: ICD-10-CM

## 2022-01-28 DIAGNOSIS — E78.00: ICD-10-CM

## 2022-01-28 DIAGNOSIS — Z88.8: ICD-10-CM

## 2022-01-28 DIAGNOSIS — Z20.822: ICD-10-CM

## 2022-01-28 PROCEDURE — U0002 COVID-19 LAB TEST NON-CDC: HCPCS

## 2022-01-28 PROCEDURE — G0378 HOSPITAL OBSERVATION PER HR: HCPCS

## 2022-01-29 LAB
ANION GAP SERPL CALC-SCNC: 13.5 MMOL/L (ref 5–15)
ANION GAP SERPL CALC-SCNC: 15.8 MMOL/L (ref 5–15)
CHLORIDE SERPL-SCNC: 102 MMOL/L (ref 98–107)
CHLORIDE SERPL-SCNC: 105 MMOL/L (ref 98–107)
SODIUM SERPL-SCNC: 137 MMOL/L (ref 136–145)
SODIUM SERPL-SCNC: 139 MMOL/L (ref 136–145)

## 2022-01-29 RX ADMIN — ROPINIROLE SCH MG: 1 TABLET ORAL at 18:03

## 2022-01-29 RX ADMIN — METRONIDAZOLE SCH MLS/HR: 500 SOLUTION INTRAVENOUS at 01:44

## 2022-01-29 RX ADMIN — ROPINIROLE SCH MG: 1 TABLET ORAL at 21:45

## 2022-01-29 RX ADMIN — METRONIDAZOLE SCH: 500 SOLUTION INTRAVENOUS at 10:44

## 2022-01-29 RX ADMIN — ROPINIROLE SCH MG: 1 TABLET ORAL at 14:00

## 2022-01-30 LAB
ANION GAP SERPL CALC-SCNC: 10.7 MMOL/L (ref 5–15)
CHLORIDE SERPL-SCNC: 109 MMOL/L (ref 98–107)
SODIUM SERPL-SCNC: 141 MMOL/L (ref 136–145)

## 2022-01-30 RX ADMIN — ROPINIROLE SCH MG: 1 TABLET ORAL at 11:50
